# Patient Record
Sex: FEMALE | Race: ASIAN | NOT HISPANIC OR LATINO
[De-identification: names, ages, dates, MRNs, and addresses within clinical notes are randomized per-mention and may not be internally consistent; named-entity substitution may affect disease eponyms.]

---

## 2020-08-03 ENCOUNTER — APPOINTMENT (OUTPATIENT)
Dept: ANTEPARTUM | Facility: CLINIC | Age: 40
End: 2020-08-03

## 2020-08-03 ENCOUNTER — APPOINTMENT (OUTPATIENT)
Dept: ANTEPARTUM | Facility: CLINIC | Age: 40
End: 2020-08-03
Payer: COMMERCIAL

## 2020-08-03 ENCOUNTER — ASOB RESULT (OUTPATIENT)
Age: 40
End: 2020-08-03

## 2020-08-03 ENCOUNTER — APPOINTMENT (OUTPATIENT)
Dept: OBGYN | Facility: CLINIC | Age: 40
End: 2020-08-03
Payer: COMMERCIAL

## 2020-08-03 VITALS
HEIGHT: 63 IN | DIASTOLIC BLOOD PRESSURE: 70 MMHG | SYSTOLIC BLOOD PRESSURE: 120 MMHG | WEIGHT: 152 LBS | BODY MASS INDEX: 26.93 KG/M2

## 2020-08-03 PROBLEM — Z00.00 ENCOUNTER FOR PREVENTIVE HEALTH EXAMINATION: Status: ACTIVE | Noted: 2020-08-03

## 2020-08-03 LAB — HCG UR QL: POSITIVE

## 2020-08-03 PROCEDURE — 76801 OB US < 14 WKS SINGLE FETUS: CPT

## 2020-08-03 PROCEDURE — 81025 URINE PREGNANCY TEST: CPT

## 2020-08-03 PROCEDURE — 0501F PRENATAL FLOW SHEET: CPT

## 2020-08-05 ENCOUNTER — LABORATORY RESULT (OUTPATIENT)
Age: 40
End: 2020-08-05

## 2020-08-06 ENCOUNTER — TRANSCRIPTION ENCOUNTER (OUTPATIENT)
Age: 40
End: 2020-08-06

## 2020-08-14 ENCOUNTER — LABORATORY RESULT (OUTPATIENT)
Age: 40
End: 2020-08-14

## 2020-08-14 ENCOUNTER — APPOINTMENT (OUTPATIENT)
Dept: ANTEPARTUM | Facility: CLINIC | Age: 40
End: 2020-08-14

## 2020-08-14 ENCOUNTER — APPOINTMENT (OUTPATIENT)
Dept: MATERNAL FETAL MEDICINE | Facility: CLINIC | Age: 40
End: 2020-08-14
Payer: COMMERCIAL

## 2020-08-14 VITALS
DIASTOLIC BLOOD PRESSURE: 70 MMHG | WEIGHT: 154.38 LBS | BODY MASS INDEX: 29.15 KG/M2 | SYSTOLIC BLOOD PRESSURE: 148 MMHG | RESPIRATION RATE: 18 BRPM | OXYGEN SATURATION: 99 % | HEIGHT: 61 IN | HEART RATE: 87 BPM

## 2020-08-14 VITALS — DIASTOLIC BLOOD PRESSURE: 70 MMHG | SYSTOLIC BLOOD PRESSURE: 130 MMHG

## 2020-08-14 DIAGNOSIS — Z87.42 PERSONAL HISTORY OF OTHER DISEASES OF THE FEMALE GENITAL TRACT: ICD-10-CM

## 2020-08-14 DIAGNOSIS — Z83.3 FAMILY HISTORY OF DIABETES MELLITUS: ICD-10-CM

## 2020-08-14 PROCEDURE — 99204 OFFICE O/P NEW MOD 45 MIN: CPT

## 2020-08-14 NOTE — DISCUSSION/SUMMARY
[FreeTextEntry1] : She is 15 weeks and 1 day gestation by an early ultrasound dating.\par \par Regarding her advanced maternal age, I informed her of the association between advanced maternal age and fetal chromosomal disorders such as Down syndrome and structural birth defects. She was told that all pregnancies have a 2 to 3% risk of having a baby born with a birth defect, and a 1 to 2 % risk of having a baby born with developmental problems that cannot be diagnosed during pregnancy. I told her that there are two types of birth defects, structural and chromosomal. She was made aware that prenatal diagnosis is available to determine whether the fetus she is carrying has normal or abnormal chromosomes,and normal or abnormal anatomy. I discussed the various screening and diagnostic tests used for prenatal diagnosis.  I explained the difference between screening and diagnostic tests.  I discussed the results of her first trimester screening test which reported an increased risk for Down syndrome.  She was offered diagnostic testing with genetic amniocentesis.  She was made aware of the small risk of miscarriage associated with the diagnostic procedures, the limitations of the procedures, and the alternative of not having the procedures. All of her questions were answered. She decided not to have a diagnostic procedure at this time. She was offered genetic counseling. She told me that she has a genetic counseling appointment on 20.  She was scheduled  to have a detailed fetal anatomy ultrasound examination in 5 weeks. \par \par I told her that advanced maternal age has been associated with a higher incidence of gestational diabetes, and preeclampsia /eclampsia.  I also told her that advanced maternal age has been associated with an increased risk of stillbirth, and therefore, I recommend delivery during the 39 week of gestation in the event she has not given birth by 39 weeks of gestation. \par \par She was told that the prevalence of HBsAg carriers in the general population is 2 to 20%.  I told her that pregnant women who are HBsAg carriers can transmit the virus to the  infant during childbirth.  Pregnancy does not increase maternal morbidity or mortality from hepatitis B viral infection or increase the risk of pregnancy complications such as stillbirth or birth defects.  She denies having risk factors associated with hepatitis B viral infection such as intravenous drug use, receiving blood transfusions, and being a health care worker. She could have been infected from mother to infant transmission or sexual intercourse.  She does not know whether her mother or sexual partner have hepatitis B infection. She has tattoos and I told her that she could have been infected from the use of unsterile equipment.during tattooing. She was advised to have her household and sexual contacts undergo hepatitis B serology testing to determine their immune status. I told her that individuals who are zero negative should be given the hepatitis B immunoglobulin as well as the hepatitis B vaccination. She was advised to notify her pediatrician of her HBsAg carrier status so that the  infant can receive the hepatitis B immunoglobulin and the hepatitis B vaccination series. I ordered a hepatitis B serology panel of antigens and antibodies. I also ordered liver function tests. \par \par She was found to have 2 uterine fibroids during an ultrasound examination done on 8/3/20. I told her that growth of fibroids during pregnancy cannot be predicted. During the first trimester of pregnancy, fibroids can remain unchanged or increase in size.  During the second trimester, smaller myomas (less = 6 cm) usually remain unchanged or increase in size and larger fibroids become smaller.  During the third trimester of pregnancy, fibroids usually remain unchanged or decrease in size.  I suggest serial ultrasounds to evaluate the size of her fibroids during the course of her pregnancy.  She was told that the presence of multiple fibroids has been associated with an increased incidence of  labor and fetal malpresentations.  The incidence of  delivery is also increased in women with a fibroid uterus.  I also told her that uterine fibroids can degenerate during pregnancy and cause acute abdominal pain, low-grade fever, and leukocytosis.  Management of uterine fibroid degeneration involves the use of analgesics and observation for  labor.   \par \par I recommend a glucola challenge test to screen for gestational diabetes between 24 and 28 weeks gestation. I also recommend the Tdap vaccine between 27 and 36 weeks of gestation to prevent pertussis infection in the  infant. I recommend the flu vaccine during flu season (October through May). She should have serial ultrasounds to evaluate fetal growth and development.  I also suggest fetal surveillance during the third trimester of pregnancy with weekly NSTs or BPPs starting at 36 weeks gestation.  She can also perform daily fetal movement counts as an adjunct to the NSTs or BPPs.\par \par

## 2020-08-14 NOTE — SURGICAL HISTORY
[Last Pap: ___] : Last Pap: [unfilled] [Abn Paps] : no abnormal pap smears [Fibroids] : no fibroids [Breast Disease] : no breast disease [STI's] : no STI's [Infertility] : no infertility [Cysts] : no cysts [OC Use] : no OC use

## 2020-08-14 NOTE — FAMILY HISTORY
[Reported Family History Of Birth Defects] : no congenital heart defects [Nadeem-Sachs Carrier] : no Nadeem-Sachs [Family History] : no mental retardation/autism [Reported Family History Of Genetic Disease] : no history of child defect in child of baby father

## 2020-08-14 NOTE — OB HISTORY
[___] : at [unfilled] weeks GA [LMP: ___] : LMP: [unfilled] [RACHEL: ___] : RACHEL: [unfilled] [Spontaneous] : Spontaneous conception [at ___ wks] : at [unfilled] weeks [Sonogram] : sonogram [Normal Amount/Duration] : was of a normal amount and duration [Regular Cycle Intervals] : periods have been regular [FreeTextEntry1] : Pregnancy dated by a first trimester office sonogram done on June 16, 2020 by OB provider.  \par \par A first trimester screening test done on August 3, 2020 reported an increased risk for Down syndrome, and a low-risk for trisomy 18/13.\par \par Prenatal laboratory testing done August 5, 2020 revealed a positive hepatitis B surface antigen.\par  [Spotting Between  Menses] : no spotting between menses

## 2020-08-14 NOTE — PAST MEDICAL HISTORY
[HIV Infection] : no HIV [Exposure To Gonorrhea] : no gonorrhea [Syphilis] : no syphilis [Chlamydial Infections] : no chlamydia [Herpes Simplex] : no genital herpes [Hepatitis, C Virus] : no Hepatitis C [Human Papilloma Virus Infection] : no genital warts [Trichomoniasis] : no trichomoniasis

## 2020-08-14 NOTE — CHIEF COMPLAINT
[G ___] : G [unfilled] [P ___] : P [unfilled] [de-identified] : having a positive Hepatitis B surface antigen test during pregnancy

## 2020-08-14 NOTE — VITALS
[US Date: ___] : ultrasound performed on [unfilled]. [GA= ___ Weeks] : Results were GA of [unfilled] weeks [RACHEL by US (date): ___] : The calculated RACHEL by US is [unfilled] [GA= ___ Days] : and [unfilled] day(s) [By US] : this is the final RACHEL

## 2020-08-14 NOTE — ACTIVE PROBLEMS
[Diabetes Mellitus] : no diabetes mellitus [Heart Disease] : no heart disease [Hypertension] : no hypertension [Renal Disease] : no kidney disease, no UTI [Autoimmune Disease] : no autoimmune disease [Neurologic Disorder] : no neurologic disorder, no epilepsy [Psychiatric Disorders] : no psychiatric disorders [Hepatic Disorder] : no hepatitis, no liver disease [Depression] : no depression, no post partum depression [Thyroid Disorder] : no thyroid dysfunction [Thrombophlebitis] : no varicosities, no phlebitis [Blood Transfusion (___ Ml)] : no history of blood transfusion [Trauma] : no trauma/violence

## 2020-08-17 LAB
ALBUMIN SERPL ELPH-MCNC: 4.2 G/DL
ALP BLD-CCNC: 59 U/L
ALT SERPL-CCNC: 28 U/L
ANION GAP SERPL CALC-SCNC: 20 MMOL/L
AST SERPL-CCNC: 24 U/L
BILIRUB SERPL-MCNC: 0.2 MG/DL
BUN SERPL-MCNC: 6 MG/DL
CALCIUM SERPL-MCNC: 9.2 MG/DL
CHLORIDE SERPL-SCNC: 101 MMOL/L
CO2 SERPL-SCNC: 18 MMOL/L
CREAT SERPL-MCNC: 0.54 MG/DL
GLUCOSE SERPL-MCNC: 23 MG/DL
HBV CORE IGG+IGM SER QL: REACTIVE
HBV CORE IGM SER QL: NONREACTIVE
HBV SURFACE AB SER QL: NONREACTIVE
HBV SURFACE AB SER QL: NONREACTIVE
HBV SURFACE AG SER QL: REACTIVE
HBV SURFACE AG SERPL QL IA: REACTIVE
POTASSIUM SERPL-SCNC: 3.8 MMOL/L
PROT SERPL-MCNC: 6.8 G/DL
SODIUM SERPL-SCNC: 140 MMOL/L

## 2020-08-19 LAB
HBV DNA # SERPL NAA+PROBE: NOT DETECTED
HBV E AB SER QL: POSITIVE
HBV E AG SER QL: NEGATIVE
HBV E AG SER QL: NEGATIVE
HEPB DNA PCR LOG: NOT DETECTED LOG10IU/ML

## 2020-09-04 ENCOUNTER — APPOINTMENT (OUTPATIENT)
Dept: MATERNAL FETAL MEDICINE | Facility: CLINIC | Age: 40
End: 2020-09-04

## 2020-09-09 ENCOUNTER — APPOINTMENT (OUTPATIENT)
Dept: OBGYN | Facility: CLINIC | Age: 40
End: 2020-09-09
Payer: COMMERCIAL

## 2020-09-09 VITALS
HEIGHT: 61 IN | DIASTOLIC BLOOD PRESSURE: 75 MMHG | BODY MASS INDEX: 29.07 KG/M2 | SYSTOLIC BLOOD PRESSURE: 117 MMHG | WEIGHT: 154 LBS

## 2020-09-09 PROCEDURE — 0502F SUBSEQUENT PRENATAL CARE: CPT

## 2020-09-16 ENCOUNTER — APPOINTMENT (OUTPATIENT)
Dept: ANTEPARTUM | Facility: CLINIC | Age: 40
End: 2020-09-16
Payer: COMMERCIAL

## 2020-09-16 ENCOUNTER — ASOB RESULT (OUTPATIENT)
Age: 40
End: 2020-09-16

## 2020-09-16 PROCEDURE — 76811 OB US DETAILED SNGL FETUS: CPT

## 2020-09-16 PROCEDURE — 36415 COLL VENOUS BLD VENIPUNCTURE: CPT

## 2020-09-21 LAB
1ST TRIMESTER DATA: NORMAL
1ST TRIMESTER DATA: NORMAL
2ND TRIMESTER DATA: NORMAL
ADDENDUM DOC: NORMAL
ADDENDUM DOC: NORMAL
AFP PNL SERPL: NORMAL
AFP PNL SERPL: NORMAL
AFP SERPL-ACNC: NORMAL
B-HCG FREE SERPL-MCNC: NORMAL
CLINICAL BIOCHEMIST REVIEW: ABNORMAL
CLINICAL BIOCHEMIST REVIEW: NORMAL
FREE BETA HCG 1ST TRIMESTER: NORMAL
FREE BETA HCG 1ST TRIMESTER: NORMAL
INHIBIN A SERPL-MCNC: NORMAL
Lab: NORMAL
NOTES NTD: NORMAL
NOTES NTD: NORMAL
NT: NORMAL
NT: NORMAL
PAPP-A SERPL-ACNC: NORMAL
PAPP-A SERPL-ACNC: NORMAL
TRISOMY 18/3: NORMAL
U ESTRIOL SERPL-SCNC: NORMAL

## 2020-09-23 LAB
CLARIM 15Q11.2: NORMAL
CLARIM 1P36: NORMAL
CLARIM 22Q11.2: NORMAL
CLARIM 4P-/WOLF-HIRSCHHORN: NORMAL
CLARIM 5P-/CRI DU CHAT: NORMAL
CLARIM ADDITIONAL INFO: NORMAL
CLARIM CHROMOSOME 13: NORMAL
CLARIM CHROMOSOME 18: NORMAL
CLARIM CHROMOSOME 21: NORMAL
CLARIM SEX CHROMOSOMES: NORMAL
CLARITEST NIPT W/MICRO: NORMAL

## 2020-10-09 ENCOUNTER — APPOINTMENT (OUTPATIENT)
Dept: OBGYN | Facility: CLINIC | Age: 40
End: 2020-10-09
Payer: COMMERCIAL

## 2020-10-09 ENCOUNTER — NON-APPOINTMENT (OUTPATIENT)
Age: 40
End: 2020-10-09

## 2020-10-09 VITALS
SYSTOLIC BLOOD PRESSURE: 110 MMHG | BODY MASS INDEX: 30.02 KG/M2 | WEIGHT: 159 LBS | HEIGHT: 61 IN | DIASTOLIC BLOOD PRESSURE: 70 MMHG

## 2020-10-09 PROCEDURE — 90686 IIV4 VACC NO PRSV 0.5 ML IM: CPT

## 2020-10-09 PROCEDURE — 90471 IMMUNIZATION ADMIN: CPT

## 2020-10-09 PROCEDURE — 0502F SUBSEQUENT PRENATAL CARE: CPT

## 2020-10-30 ENCOUNTER — LABORATORY RESULT (OUTPATIENT)
Age: 40
End: 2020-10-30

## 2020-11-06 ENCOUNTER — NON-APPOINTMENT (OUTPATIENT)
Age: 40
End: 2020-11-06

## 2020-11-06 ENCOUNTER — APPOINTMENT (OUTPATIENT)
Dept: OBGYN | Facility: CLINIC | Age: 40
End: 2020-11-06
Payer: COMMERCIAL

## 2020-11-06 VITALS
BODY MASS INDEX: 30.4 KG/M2 | HEIGHT: 61 IN | DIASTOLIC BLOOD PRESSURE: 70 MMHG | SYSTOLIC BLOOD PRESSURE: 106 MMHG | WEIGHT: 161 LBS

## 2020-11-06 PROCEDURE — 0502F SUBSEQUENT PRENATAL CARE: CPT

## 2020-11-09 ENCOUNTER — LABORATORY RESULT (OUTPATIENT)
Age: 40
End: 2020-11-09

## 2020-11-13 ENCOUNTER — APPOINTMENT (OUTPATIENT)
Dept: ANTEPARTUM | Facility: CLINIC | Age: 40
End: 2020-11-13
Payer: COMMERCIAL

## 2020-11-13 ENCOUNTER — ASOB RESULT (OUTPATIENT)
Age: 40
End: 2020-11-13

## 2020-11-13 PROCEDURE — 76816 OB US FOLLOW-UP PER FETUS: CPT

## 2020-11-13 PROCEDURE — 99072 ADDL SUPL MATRL&STAF TM PHE: CPT

## 2020-12-01 ENCOUNTER — APPOINTMENT (OUTPATIENT)
Dept: GASTROENTEROLOGY | Facility: CLINIC | Age: 40
End: 2020-12-01
Payer: COMMERCIAL

## 2020-12-01 VITALS
HEIGHT: 61 IN | SYSTOLIC BLOOD PRESSURE: 115 MMHG | HEART RATE: 79 BPM | TEMPERATURE: 98 F | BODY MASS INDEX: 31.15 KG/M2 | DIASTOLIC BLOOD PRESSURE: 61 MMHG | WEIGHT: 165 LBS

## 2020-12-01 DIAGNOSIS — Z78.9 OTHER SPECIFIED HEALTH STATUS: ICD-10-CM

## 2020-12-01 PROCEDURE — 99202 OFFICE O/P NEW SF 15 MIN: CPT

## 2020-12-01 PROCEDURE — 99072 ADDL SUPL MATRL&STAF TM PHE: CPT

## 2020-12-01 NOTE — ASSESSMENT
[FreeTextEntry1] : Pt with recently discovered positive Hep B antigen and antibody. LFTs are normal and Hep B is undetectable by PCR. No current treatment necessary. Patient was educated on importance of vaccinating her child after birth and to further discuss it with her OBGYN and future pediatrician. All questions and concerns addressed.\par F/U in 6 months

## 2020-12-01 NOTE — ADDENDUM
[FreeTextEntry1] : i, Lu Bass NP, acted as scribe for Dr. Avery Sewell for this patient encounter\par \par I have personally seen and examined the patient. I agree with the history, physical examination, assessment and recommendations as noted above.\par \par Avery Sewell MD\par Gastroenterology\par

## 2020-12-01 NOTE — PHYSICAL EXAM
Results given   [General Appearance - Alert] : alert [General Appearance - In No Acute Distress] : in no acute distress [Sclera] : the sclera and conjunctiva were normal [PERRL With Normal Accommodation] : pupils were equal in size, round, and reactive to light [Extraocular Movements] : extraocular movements were intact [Outer Ear] : the ears and nose were normal in appearance [Oropharynx] : the oropharynx was normal [Neck Appearance] : the appearance of the neck was normal [Neck Cervical Mass (___cm)] : no neck mass was observed [Jugular Venous Distention Increased] : there was no jugular-venous distention [Thyroid Diffuse Enlargement] : the thyroid was not enlarged [Thyroid Nodule] : there were no palpable thyroid nodules [Auscultation Breath Sounds / Voice Sounds] : lungs were clear to auscultation bilaterally [Heart Rate And Rhythm] : heart rate was normal and rhythm regular [Heart Sounds] : normal S1 and S2 [Heart Sounds Gallop] : no gallops [Murmurs] : no murmurs [Heart Sounds Pericardial Friction Rub] : no pericardial rub [Bowel Sounds] : normal bowel sounds [Abdomen Soft] : soft [Abdomen Tenderness] : non-tender [Abdomen Mass (___ Cm)] : no abdominal mass palpated [Abnormal Walk] : normal gait [Nail Clubbing] : no clubbing  or cyanosis of the fingernails [Musculoskeletal - Swelling] : no joint swelling seen [Motor Tone] : muscle strength and tone were normal [Skin Color & Pigmentation] : normal skin color and pigmentation [Skin Turgor] : normal skin turgor [] : no rash [Deep Tendon Reflexes (DTR)] : deep tendon reflexes were 2+ and symmetric [Sensation] : the sensory exam was normal to light touch and pinprick [No Focal Deficits] : no focal deficits [Oriented To Time, Place, And Person] : oriented to person, place, and time [Impaired Insight] : insight and judgment were intact [Affect] : the affect was normal [FreeTextEntry1] : gravid

## 2020-12-01 NOTE — HISTORY OF PRESENT ILLNESS
[Heartburn] : denies heartburn [Nausea] : denies nausea [Vomiting] : denies vomiting [Diarrhea] : denies diarrhea [Constipation] : denies constipation [Yellow Skin Or Eyes (Jaundice)] : denies jaundice [Abdominal Pain] : denies abdominal pain [Abdominal Swelling] : denies abdominal swelling [Rectal Pain] : denies rectal pain [de-identified] : FLO KINNEY is a 40 year old female presenting today for evaluation of a recent Hep B diagnosis. She is currently 31 weeks pregnant with her first child. In August she underwent basic screenings with her OBGYN and was found to have a positive Hep B antigen. Her Hep B PCR in undetectable and her LFTs are normal. She denies any known liver disease. She was born in the [facility] and moved to the  in 1995 and did not know that she was positive until this recent test. She is unsure if she was ever vaccinated for Hep B as a child.

## 2020-12-04 ENCOUNTER — NON-APPOINTMENT (OUTPATIENT)
Age: 40
End: 2020-12-04

## 2020-12-04 ENCOUNTER — APPOINTMENT (OUTPATIENT)
Dept: OBGYN | Facility: CLINIC | Age: 40
End: 2020-12-04
Payer: COMMERCIAL

## 2020-12-04 VITALS
DIASTOLIC BLOOD PRESSURE: 74 MMHG | SYSTOLIC BLOOD PRESSURE: 120 MMHG | HEIGHT: 61 IN | BODY MASS INDEX: 30.78 KG/M2 | WEIGHT: 163 LBS

## 2020-12-04 PROCEDURE — 0502F SUBSEQUENT PRENATAL CARE: CPT

## 2020-12-04 PROCEDURE — 90471 IMMUNIZATION ADMIN: CPT

## 2020-12-04 PROCEDURE — 90715 TDAP VACCINE 7 YRS/> IM: CPT

## 2020-12-21 ENCOUNTER — APPOINTMENT (OUTPATIENT)
Dept: OBGYN | Facility: CLINIC | Age: 40
End: 2020-12-21
Payer: COMMERCIAL

## 2020-12-21 ENCOUNTER — NON-APPOINTMENT (OUTPATIENT)
Age: 40
End: 2020-12-21

## 2020-12-21 VITALS
DIASTOLIC BLOOD PRESSURE: 76 MMHG | HEIGHT: 61 IN | WEIGHT: 165 LBS | SYSTOLIC BLOOD PRESSURE: 117 MMHG | BODY MASS INDEX: 31.15 KG/M2

## 2020-12-21 PROCEDURE — 0502F SUBSEQUENT PRENATAL CARE: CPT

## 2020-12-28 ENCOUNTER — ASOB RESULT (OUTPATIENT)
Age: 40
End: 2020-12-28

## 2020-12-28 ENCOUNTER — APPOINTMENT (OUTPATIENT)
Dept: ANTEPARTUM | Facility: CLINIC | Age: 40
End: 2020-12-28
Payer: COMMERCIAL

## 2020-12-28 PROCEDURE — 76819 FETAL BIOPHYS PROFIL W/O NST: CPT

## 2020-12-28 PROCEDURE — 76816 OB US FOLLOW-UP PER FETUS: CPT

## 2020-12-28 PROCEDURE — 99072 ADDL SUPL MATRL&STAF TM PHE: CPT

## 2021-01-05 ENCOUNTER — APPOINTMENT (OUTPATIENT)
Dept: OBGYN | Facility: CLINIC | Age: 41
End: 2021-01-05
Payer: COMMERCIAL

## 2021-01-05 VITALS
HEIGHT: 61 IN | SYSTOLIC BLOOD PRESSURE: 115 MMHG | WEIGHT: 168 LBS | DIASTOLIC BLOOD PRESSURE: 78 MMHG | BODY MASS INDEX: 31.72 KG/M2

## 2021-01-05 PROCEDURE — 0502F SUBSEQUENT PRENATAL CARE: CPT

## 2021-01-07 LAB
GP B STREP DNA SPEC QL NAA+PROBE: NORMAL
GP B STREP DNA SPEC QL NAA+PROBE: NOT DETECTED
SOURCE GBS: NORMAL

## 2021-01-10 LAB
BASOPHILS # BLD AUTO: 0.04 K/UL
BASOPHILS NFR BLD AUTO: 0.4 %
EOSINOPHIL # BLD AUTO: 0.13 K/UL
EOSINOPHIL NFR BLD AUTO: 1.2 %
HCT VFR BLD CALC: 37.2 %
HGB BLD-MCNC: 11.8 G/DL
HIV1+2 AB SPEC QL IA.RAPID: NONREACTIVE
IMM GRANULOCYTES NFR BLD AUTO: 1.2 %
LYMPHOCYTES # BLD AUTO: 2.04 K/UL
LYMPHOCYTES NFR BLD AUTO: 19.5 %
MAN DIFF?: NORMAL
MCHC RBC-ENTMCNC: 28.4 PG
MCHC RBC-ENTMCNC: 31.7 GM/DL
MCV RBC AUTO: 89.4 FL
MONOCYTES # BLD AUTO: 0.85 K/UL
MONOCYTES NFR BLD AUTO: 8.1 %
NEUTROPHILS # BLD AUTO: 7.26 K/UL
NEUTROPHILS NFR BLD AUTO: 69.6 %
PLATELET # BLD AUTO: 204 K/UL
RBC # BLD: 4.16 M/UL
RBC # FLD: 13.8 %
WBC # FLD AUTO: 10.45 K/UL

## 2021-01-15 ENCOUNTER — APPOINTMENT (OUTPATIENT)
Dept: OBGYN | Facility: CLINIC | Age: 41
End: 2021-01-15
Payer: COMMERCIAL

## 2021-01-15 VITALS
WEIGHT: 174 LBS | HEIGHT: 61 IN | BODY MASS INDEX: 32.85 KG/M2 | SYSTOLIC BLOOD PRESSURE: 110 MMHG | DIASTOLIC BLOOD PRESSURE: 70 MMHG

## 2021-01-15 PROCEDURE — 0502F SUBSEQUENT PRENATAL CARE: CPT

## 2021-01-18 ENCOUNTER — APPOINTMENT (OUTPATIENT)
Dept: ANTEPARTUM | Facility: CLINIC | Age: 41
End: 2021-01-18

## 2021-01-22 ENCOUNTER — APPOINTMENT (OUTPATIENT)
Dept: OBGYN | Facility: CLINIC | Age: 41
End: 2021-01-22
Payer: COMMERCIAL

## 2021-01-22 VITALS
BODY MASS INDEX: 30.05 KG/M2 | DIASTOLIC BLOOD PRESSURE: 70 MMHG | HEIGHT: 64 IN | SYSTOLIC BLOOD PRESSURE: 110 MMHG | WEIGHT: 176 LBS

## 2021-01-22 PROCEDURE — 99072 ADDL SUPL MATRL&STAF TM PHE: CPT

## 2021-01-22 PROCEDURE — 0502F SUBSEQUENT PRENATAL CARE: CPT

## 2021-01-22 PROCEDURE — 59426 ANTEPARTUM CARE ONLY: CPT

## 2021-01-27 ENCOUNTER — TRANSCRIPTION ENCOUNTER (OUTPATIENT)
Age: 41
End: 2021-01-27

## 2021-01-28 ENCOUNTER — OUTPATIENT (OUTPATIENT)
Dept: OUTPATIENT SERVICES | Facility: HOSPITAL | Age: 41
LOS: 1 days | End: 2021-01-28

## 2021-01-28 ENCOUNTER — INPATIENT (INPATIENT)
Facility: HOSPITAL | Age: 41
LOS: 3 days | Discharge: HOME | End: 2021-02-01
Payer: COMMERCIAL

## 2021-01-28 VITALS
RESPIRATION RATE: 20 BRPM | HEART RATE: 75 BPM | SYSTOLIC BLOOD PRESSURE: 161 MMHG | DIASTOLIC BLOOD PRESSURE: 95 MMHG | TEMPERATURE: 98 F

## 2021-01-28 DIAGNOSIS — Z98.890 OTHER SPECIFIED POSTPROCEDURAL STATES: Chronic | ICD-10-CM

## 2021-01-28 DIAGNOSIS — O34.219 MATERNAL CARE FOR UNSPECIFIED TYPE SCAR FROM PREVIOUS CESAREAN DELIVERY: ICD-10-CM

## 2021-01-28 DIAGNOSIS — Z01.818 ENCOUNTER FOR OTHER PREPROCEDURAL EXAMINATION: ICD-10-CM

## 2021-01-28 DIAGNOSIS — O47.1 FALSE LABOR AT OR AFTER 37 COMPLETED WEEKS OF GESTATION: ICD-10-CM

## 2021-01-28 LAB
ABO RH CONFIRMATION: SIGNIFICANT CHANGE UP
ALBUMIN SERPL ELPH-MCNC: 4 G/DL — SIGNIFICANT CHANGE UP (ref 3.3–5.2)
ALP SERPL-CCNC: 166 U/L — HIGH (ref 40–120)
ALT FLD-CCNC: 8 U/L — SIGNIFICANT CHANGE UP
ANION GAP SERPL CALC-SCNC: 14 MMOL/L — SIGNIFICANT CHANGE UP (ref 5–17)
APTT BLD: 28.4 SEC — SIGNIFICANT CHANGE UP (ref 27.5–35.5)
AST SERPL-CCNC: 15 U/L — SIGNIFICANT CHANGE UP
BASOPHILS # BLD AUTO: 0.03 K/UL — SIGNIFICANT CHANGE UP (ref 0–0.2)
BASOPHILS NFR BLD AUTO: 0.3 % — SIGNIFICANT CHANGE UP (ref 0–2)
BILIRUB SERPL-MCNC: 0.3 MG/DL — LOW (ref 0.4–2)
BLD GP AB SCN SERPL QL: SIGNIFICANT CHANGE UP
BUN SERPL-MCNC: 10 MG/DL — SIGNIFICANT CHANGE UP (ref 8–20)
CALCIUM SERPL-MCNC: 9 MG/DL — SIGNIFICANT CHANGE UP (ref 8.6–10.2)
CHLORIDE SERPL-SCNC: 102 MMOL/L — SIGNIFICANT CHANGE UP (ref 98–107)
CO2 SERPL-SCNC: 20 MMOL/L — LOW (ref 22–29)
CREAT SERPL-MCNC: 0.51 MG/DL — SIGNIFICANT CHANGE UP (ref 0.5–1.3)
EOSINOPHIL # BLD AUTO: 0.08 K/UL — SIGNIFICANT CHANGE UP (ref 0–0.5)
EOSINOPHIL NFR BLD AUTO: 0.9 % — SIGNIFICANT CHANGE UP (ref 0–6)
FIBRINOGEN PPP-MCNC: 858 MG/DL — CRITICAL HIGH (ref 290–520)
GLUCOSE SERPL-MCNC: 81 MG/DL — SIGNIFICANT CHANGE UP (ref 70–99)
HCT VFR BLD CALC: 39.2 % — SIGNIFICANT CHANGE UP (ref 34.5–45)
HGB BLD-MCNC: 13.6 G/DL — SIGNIFICANT CHANGE UP (ref 11.5–15.5)
IMM GRANULOCYTES NFR BLD AUTO: 0.8 % — SIGNIFICANT CHANGE UP (ref 0–1.5)
INR BLD: 0.9 RATIO — SIGNIFICANT CHANGE UP (ref 0.88–1.16)
LDH SERPL L TO P-CCNC: 188 U/L — SIGNIFICANT CHANGE UP (ref 98–192)
LYMPHOCYTES # BLD AUTO: 1.53 K/UL — SIGNIFICANT CHANGE UP (ref 1–3.3)
LYMPHOCYTES # BLD AUTO: 17.4 % — SIGNIFICANT CHANGE UP (ref 13–44)
MAGNESIUM SERPL-MCNC: 3.9 MG/DL — HIGH (ref 1.6–2.6)
MCHC RBC-ENTMCNC: 29.8 PG — SIGNIFICANT CHANGE UP (ref 27–34)
MCHC RBC-ENTMCNC: 34.7 GM/DL — SIGNIFICANT CHANGE UP (ref 32–36)
MCV RBC AUTO: 85.8 FL — SIGNIFICANT CHANGE UP (ref 80–100)
MONOCYTES # BLD AUTO: 0.71 K/UL — SIGNIFICANT CHANGE UP (ref 0–0.9)
MONOCYTES NFR BLD AUTO: 8.1 % — SIGNIFICANT CHANGE UP (ref 2–14)
NEUTROPHILS # BLD AUTO: 6.35 K/UL — SIGNIFICANT CHANGE UP (ref 1.8–7.4)
NEUTROPHILS NFR BLD AUTO: 72.5 % — SIGNIFICANT CHANGE UP (ref 43–77)
PLATELET # BLD AUTO: 200 K/UL — SIGNIFICANT CHANGE UP (ref 150–400)
POTASSIUM SERPL-MCNC: 3.8 MMOL/L — SIGNIFICANT CHANGE UP (ref 3.5–5.3)
POTASSIUM SERPL-SCNC: 3.8 MMOL/L — SIGNIFICANT CHANGE UP (ref 3.5–5.3)
PROT SERPL-MCNC: 7 G/DL — SIGNIFICANT CHANGE UP (ref 6.6–8.7)
PROTHROM AB SERPL-ACNC: 10.5 SEC — LOW (ref 10.6–13.6)
RBC # BLD: 4.57 M/UL — SIGNIFICANT CHANGE UP (ref 3.8–5.2)
RBC # FLD: 13.7 % — SIGNIFICANT CHANGE UP (ref 10.3–14.5)
SARS-COV-2 RNA SPEC QL NAA+PROBE: SIGNIFICANT CHANGE UP
SODIUM SERPL-SCNC: 136 MMOL/L — SIGNIFICANT CHANGE UP (ref 135–145)
URATE SERPL-MCNC: 3.1 MG/DL — SIGNIFICANT CHANGE UP (ref 2.4–5.7)
WBC # BLD: 8.77 K/UL — SIGNIFICANT CHANGE UP (ref 3.8–10.5)
WBC # FLD AUTO: 8.77 K/UL — SIGNIFICANT CHANGE UP (ref 3.8–10.5)

## 2021-01-28 PROCEDURE — 59515 CESAREAN DELIVERY: CPT

## 2021-01-28 RX ORDER — NALOXONE HYDROCHLORIDE 4 MG/.1ML
0.1 SPRAY NASAL
Refills: 0 | Status: DISCONTINUED | OUTPATIENT
Start: 2021-01-28 | End: 2021-02-01

## 2021-01-28 RX ORDER — LANOLIN
1 OINTMENT (GRAM) TOPICAL EVERY 6 HOURS
Refills: 0 | Status: DISCONTINUED | OUTPATIENT
Start: 2021-01-28 | End: 2021-02-01

## 2021-01-28 RX ORDER — TETANUS TOXOID, REDUCED DIPHTHERIA TOXOID AND ACELLULAR PERTUSSIS VACCINE, ADSORBED 5; 2.5; 8; 8; 2.5 [IU]/.5ML; [IU]/.5ML; UG/.5ML; UG/.5ML; UG/.5ML
0.5 SUSPENSION INTRAMUSCULAR ONCE
Refills: 0 | Status: DISCONTINUED | OUTPATIENT
Start: 2021-01-28 | End: 2021-02-01

## 2021-01-28 RX ORDER — CITRIC ACID/SODIUM CITRATE 300-500 MG
30 SOLUTION, ORAL ORAL ONCE
Refills: 0 | Status: DISCONTINUED | OUTPATIENT
Start: 2021-01-28 | End: 2021-01-28

## 2021-01-28 RX ORDER — OXYCODONE HYDROCHLORIDE 5 MG/1
5 TABLET ORAL ONCE
Refills: 0 | Status: DISCONTINUED | OUTPATIENT
Start: 2021-01-28 | End: 2021-02-01

## 2021-01-28 RX ORDER — MAGNESIUM SULFATE 500 MG/ML
2 VIAL (ML) INJECTION
Qty: 40 | Refills: 0 | Status: DISCONTINUED | OUTPATIENT
Start: 2021-01-28 | End: 2021-01-28

## 2021-01-28 RX ORDER — BUTORPHANOL TARTRATE 2 MG/ML
0.25 INJECTION, SOLUTION INTRAMUSCULAR; INTRAVENOUS EVERY 6 HOURS
Refills: 0 | Status: DISCONTINUED | OUTPATIENT
Start: 2021-01-28 | End: 2021-01-28

## 2021-01-28 RX ORDER — DIPHENHYDRAMINE HCL 50 MG
25 CAPSULE ORAL EVERY 6 HOURS
Refills: 0 | Status: DISCONTINUED | OUTPATIENT
Start: 2021-01-28 | End: 2021-02-01

## 2021-01-28 RX ORDER — OXYTOCIN 10 UNIT/ML
333.33 VIAL (ML) INJECTION
Qty: 20 | Refills: 0 | Status: DISCONTINUED | OUTPATIENT
Start: 2021-01-28 | End: 2021-02-01

## 2021-01-28 RX ORDER — ENOXAPARIN SODIUM 100 MG/ML
40 INJECTION SUBCUTANEOUS EVERY 24 HOURS
Refills: 0 | Status: DISCONTINUED | OUTPATIENT
Start: 2021-01-28 | End: 2021-02-01

## 2021-01-28 RX ORDER — FAMOTIDINE 10 MG/ML
20 INJECTION INTRAVENOUS ONCE
Refills: 0 | Status: COMPLETED | OUTPATIENT
Start: 2021-01-28 | End: 2021-01-28

## 2021-01-28 RX ORDER — SIMETHICONE 80 MG/1
80 TABLET, CHEWABLE ORAL EVERY 4 HOURS
Refills: 0 | Status: DISCONTINUED | OUTPATIENT
Start: 2021-01-28 | End: 2021-02-01

## 2021-01-28 RX ORDER — DEXAMETHASONE 0.5 MG/5ML
4 ELIXIR ORAL EVERY 6 HOURS
Refills: 0 | Status: DISCONTINUED | OUTPATIENT
Start: 2021-01-28 | End: 2021-02-01

## 2021-01-28 RX ORDER — MAGNESIUM HYDROXIDE 400 MG/1
30 TABLET, CHEWABLE ORAL
Refills: 0 | Status: DISCONTINUED | OUTPATIENT
Start: 2021-01-28 | End: 2021-02-01

## 2021-01-28 RX ORDER — SODIUM CHLORIDE 9 MG/ML
1000 INJECTION, SOLUTION INTRAVENOUS
Refills: 0 | Status: DISCONTINUED | OUTPATIENT
Start: 2021-01-28 | End: 2021-01-28

## 2021-01-28 RX ORDER — MAGNESIUM SULFATE 500 MG/ML
4 VIAL (ML) INJECTION ONCE
Refills: 0 | Status: COMPLETED | OUTPATIENT
Start: 2021-01-28 | End: 2021-01-28

## 2021-01-28 RX ORDER — IBUPROFEN 200 MG
600 TABLET ORAL EVERY 6 HOURS
Refills: 0 | Status: COMPLETED | OUTPATIENT
Start: 2021-01-28 | End: 2021-12-27

## 2021-01-28 RX ORDER — CEFAZOLIN SODIUM 1 G
2000 VIAL (EA) INJECTION ONCE
Refills: 0 | Status: COMPLETED | OUTPATIENT
Start: 2021-01-28 | End: 2021-01-28

## 2021-01-28 RX ORDER — ONDANSETRON 8 MG/1
4 TABLET, FILM COATED ORAL EVERY 6 HOURS
Refills: 0 | Status: DISCONTINUED | OUTPATIENT
Start: 2021-01-28 | End: 2021-02-01

## 2021-01-28 RX ORDER — METOCLOPRAMIDE HCL 10 MG
10 TABLET ORAL ONCE
Refills: 0 | Status: COMPLETED | OUTPATIENT
Start: 2021-01-28 | End: 2021-01-28

## 2021-01-28 RX ORDER — KETOROLAC TROMETHAMINE 30 MG/ML
30 SYRINGE (ML) INJECTION EVERY 6 HOURS
Refills: 0 | Status: DISCONTINUED | OUTPATIENT
Start: 2021-01-28 | End: 2021-01-29

## 2021-01-28 RX ORDER — LABETALOL HCL 100 MG
20 TABLET ORAL ONCE
Refills: 0 | Status: COMPLETED | OUTPATIENT
Start: 2021-01-28 | End: 2021-01-28

## 2021-01-28 RX ORDER — SODIUM CHLORIDE 9 MG/ML
1000 INJECTION, SOLUTION INTRAVENOUS
Refills: 0 | Status: DISCONTINUED | OUTPATIENT
Start: 2021-01-28 | End: 2021-02-01

## 2021-01-28 RX ORDER — DIPHENHYDRAMINE HCL 50 MG
25 CAPSULE ORAL EVERY 4 HOURS
Refills: 0 | Status: DISCONTINUED | OUTPATIENT
Start: 2021-01-28 | End: 2021-02-01

## 2021-01-28 RX ORDER — MAGNESIUM SULFATE 500 MG/ML
2.5 VIAL (ML) INJECTION
Qty: 40 | Refills: 0 | Status: DISCONTINUED | OUTPATIENT
Start: 2021-01-28 | End: 2021-02-01

## 2021-01-28 RX ORDER — ACETAMINOPHEN 500 MG
975 TABLET ORAL
Refills: 0 | Status: DISCONTINUED | OUTPATIENT
Start: 2021-01-28 | End: 2021-02-01

## 2021-01-28 RX ORDER — OXYCODONE HYDROCHLORIDE 5 MG/1
5 TABLET ORAL
Refills: 0 | Status: COMPLETED | OUTPATIENT
Start: 2021-01-28 | End: 2021-02-04

## 2021-01-28 RX ORDER — OXYTOCIN 10 UNIT/ML
333.33 VIAL (ML) INJECTION
Qty: 20 | Refills: 0 | Status: COMPLETED | OUTPATIENT
Start: 2021-01-28 | End: 2021-01-28

## 2021-01-28 RX ORDER — SODIUM CHLORIDE 9 MG/ML
1000 INJECTION, SOLUTION INTRAVENOUS ONCE
Refills: 0 | Status: DISCONTINUED | OUTPATIENT
Start: 2021-01-28 | End: 2021-01-28

## 2021-01-28 RX ADMIN — Medication 100 MILLIGRAM(S): at 15:15

## 2021-01-28 RX ADMIN — Medication 10 MILLIGRAM(S): at 19:22

## 2021-01-28 RX ADMIN — Medication 62.5 GM/HR: at 20:32

## 2021-01-28 RX ADMIN — Medication 200 GRAM(S): at 10:10

## 2021-01-28 RX ADMIN — Medication 30 MILLIGRAM(S): at 17:32

## 2021-01-28 RX ADMIN — Medication 20 MILLIGRAM(S): at 10:10

## 2021-01-28 RX ADMIN — Medication 50 GM/HR: at 10:35

## 2021-01-28 RX ADMIN — Medication 1000 MILLIUNIT(S)/MIN: at 15:33

## 2021-01-28 RX ADMIN — FAMOTIDINE 20 MILLIGRAM(S): 10 INJECTION INTRAVENOUS at 12:19

## 2021-01-28 RX ADMIN — Medication 30 MILLIGRAM(S): at 23:35

## 2021-01-28 NOTE — OB PROVIDER H&P - NSHPPHYSICALEXAM_GEN_ALL_CORE
Vital Signs Last 24 Hrs  T(C): 36.7 (28 Jan 2021 09:57), Max: 36.7 (28 Jan 2021 09:57)  T(F): 98 (28 Jan 2021 09:57), Max: 98 (28 Jan 2021 09:57)  HR: 82 (28 Jan 2021 10:15) (75 - 88)  BP: 153/84 (28 Jan 2021 10:15) (153/84 - 166/104)  RR: 20 (28 Jan 2021 09:57) (20 - 20)    General: NAD, well-appearing  Heart: RRR  Lungs: CTAB  Abdominal: soft, gravid  Ext: non-tender, non-edematous, 2+ DTRs in UE bilaterally  Bedside sono: breech, anterior placenta  FHT: baseline 125, moderate variability, +accels, -decels   [place]: chuck irregularly

## 2021-01-28 NOTE — OB PROVIDER DELIVERY SUMMARY - NSPROVIDERDELIVERYNOTE_OBGYN_ALL_OB_FT
Brief  Delivery Summary    Procedure: pLTCS for maureen breech presentation  Findings: Viable female infant, apgars 9/9, weight 3450g,  breech presentation. Grossly normal uterus, fallopian tubes and ovaries.   Single layer uterine closure  SubQ skin closure  EBL: 573cc  UOP: 50cc  Fluids in OR: 1500cc  Complications: None

## 2021-01-28 NOTE — OB RN INTRAOPERATIVE NOTE - NS_CULTURES_OBGYN_ALL_OB
Patient:   APRIL CARABALLO JR            MRN: [id number]            FIN: [id number]               Age:   62 years     Sex:  MALE     :  59   Associated Diagnoses:   None   Author:   ROBERT HIDALGO      Chief Complaint            History of Present Illness   62yoM h/o htn, hl, esoph rupture p/w L temp-occ ICH. Per wife and daughter, pt was in usual state of health until this morning when at 11am they noted pt c/o dizziness. About an hour later pt started having n/v, which continued into the evening. Pt also became confused and lethargic during this time, prompting family to take him to OSH where HCT revealed L temp-occ ICH with edema and midline shift, for which pt was transferred to [facility] for further eval.        Complaint:.  Notes (Baseline Modified [place] Score (mRS)).        Review of Systems   Neurologic:  Negative except as documented in history of present illness.       Histories   Past Med History:    No problem items selected or recorded.   Family History:    No family history items have been selected or recorded.   Procedure History:    No active procedure history items have been selected or recorded.   Social History        No qualifying data available.  .     Family history of cerebral aneurysm:        Health Status   Current medications:    Medications (7) Active  Scheduled: (4)  *Do NOT give Anticoagulant/Antiplatelet Meds  1 each, N/A, Daily  *Do NOT give Heparin or LMWH  1 each, N/A, Daily  *Do NOT give NSAIDs  1 each, N/A, Daily  levETIRAcetam  500 mg 5 mL, IVPB, Q12H  Continuous: (2)  niCARdipine 40 mg [5 mg/hr] + premixed Solution 200 mL  200 mL, IV, 25 mL/hr  Sodium Chloride 0.9% 1,000 mL  1,000 mL, IV, 80 mL/hr  PRN: (1)  Labetalol 100 mg/20 mL inj MDV  10 mg 2 mL, Slow IV Push, Q10 Minutes        Physical Examination   VS/Measurements        Vitals between:   2018 02:16:10   TO   2018 02:16:10                   LAST  RESULT MINIMUM MAXIMUM  Temperature 36.8 36.8 36.8  Heart Rate 97 65 98  Respiratory Rate 15 15 28  NISBP           153 148 193  NIDBP           98 62 113  NIMBP           114 88 139  SpO2                    98 95 99     Neurologic:  awake and alert, oriented to self only, moderate expressive aphasia (naming 1/3), mild receptive aphasia, R homonymous hemianopsia, perrl, machuca x4.      ICH Score: Components for ICH score as follows:  GCS score:   [_]  (3-4): 2points  [_] (5-12): 1 point  [x_] (13-15): 0 points    ICH volume:  [_x] (>30cm) 1 point  [_] (<30cm) 0 point    IVH:  [_]  yes: 1point  [_x]  no: 0 points    Infratentorial origin of ICH:  [_]  Yes: 1 point  [_x]  No: 0 points    Age:  [_]  Age 80 years or older: 1 point  [_x]  younger than 80 years: 0 points    TOTAL SCORE: 1         Review / Management   Personally reviewed the follwowing imaging:             Impression and Plan     Neuro  Intracranial Hemorrhage with cerebral edema and midline shift  Admit to NCCU, close neurochecks. Diff dx includes htn vs mass vs vasc malformation vs hemorrhagic conversion of ischemic infarct vs amyloid. Given location and appearance, will check MRI w/wo and MRA brain for further eval. If edema or exam worsens may consider hyperosomolar tx.  -ICH score: 1  -Based on the ICH score of *, is associated with * mortality rate   0 points: 0%   1 point: 13%  -Neurosurgical consult  -Assess for rehab order  -Stroke Education order  -Smoking Cessation education order    CV   -Blood Pressure Goals: SBP<160, cardene gtt    Pulm  stable on RA    GI   -Dysphagia screening order  -h/o esoph rupture    ID  afebrile, slightly inc wbcs- trend    Proph  -SCDs, heparin subcutaneous held due to ICH    Dispo:  -Admission to Neuro ICU      Code Status/family discussion:    Quality measures:  HOB: >30 degrees  NIHSS: _ (Numerical Score)  ICH Score: 1  SAH Score (Dailey or Munguia & Garcias):_  Nimodipine for aSAH: _  Peridex mouthwash: _  Tidal volume:  _  Sedation/Analgesia: _  SAT: _  SBT: _  RASS: _  CAM: _  DVT prophy: _  Swallow eval within 24 hrs: _  SUP: _  Nutrition: _  Lines: _  Mobility therapies: PT/OT/Speech or Not appropriate    Critical care time 45 mins  Critical care diagnosis- ich, cerebral edema, htn                         Electronically Signed On 07/22/2018 02:26  __________________________________________________   ROBERT HIDALGO     No

## 2021-01-28 NOTE — OB NEONATOLOGY/PEDIATRICIAN DELIVERY SUMMARY - NSPEDSNEONOTESA_OBGYN_ALL_OB_FT
I was ask to attend this primary C/S sec high BP and Breech. Mom was seen in clinic today and b/c of high BPs was transferred for C/S ,Schedule for 21. Mom is 38 year old , O+ ,HIV negative,RPR NR, Rubella immune. GBS Negative. COVID Negative. Light meconium noted at time of delivery, baby born with spontaneous cry,dried,suctioned and stimulated. Apgar 9 & 9. Physical exam is remarkable for hyperextended leg bilaterally (breech presentation),otherwise unremarkable.  Observe in transitional care if remain stable transfer to NBN .   hIP us AT 4-6 Week of age. I was ask to attend this primary C/S sec high BP and Breech. Mom was seen in clinic today and b/c of high BPs was transferred for C/S ,Schedule for 21. Mom is 38 year old , O+ ,HIV negative,RPR NR, Rubella immune. GBS Negative. COVID Negative. Mother is Hepatitis BsAg + (as per ) Light meconium noted at time of delivery, baby born with spontaneous cry,dried,suctioned and stimulated. Apgar 9 & 9. Physical exam is remarkable for hyperextended leg bilaterally (breech presentation),otherwise unremarkable.  Observe in transitional care if remain stable transfer to NBN .   HiP us AT 4-6 Week of age.  Hepatitis vaccine and immunoglobulin to baby .

## 2021-01-28 NOTE — OB PROVIDER H&P - ASSESSMENT
A/P: 40 year old  at 39 weeks GA by first trimester ultrasound who is being admitted to L&D for primary C/S in setting of breech presentation and preeclampsia with severe features on MgSO4.   1. Preoperative  -Admit to L&D  -Consent  -Admission, PIH and COVID labs pending  -NPO, last PO at 0630   -IV fluids  -Ancef 2g ordered  -Fetus: Cat I tracing. Continuous toco and fetal monitoring.   -GBS: Negative, no GBS ppx required   -DVT ppx: Ambulate and SCD's while in bed     2. PECwSF based off of severe range blood pressures   -Elevated blood pressures, otherwise VSS  -Labetalol 20mg IVP given, 155/81 @1020  -MgSO4 started at 1012  -Magnesium checks q6 hours   -Magnesium serum level q4 hours after loading dose     Discussed with Dr. Scott.

## 2021-01-28 NOTE — OB PROVIDER H&P - NSHPLABSRESULTS_GEN_ALL_CORE
13.6   8.77  )-----------( 200      ( 28 Jan 2021 10:44 )             39.2     01-28    136  |  102  |  10.0  ----------------------------<  81  3.8   |  20.0<L>  |  0.51    Ca    9.0      28 Jan 2021 12:30    TPro  7.0  /  Alb  4.0  /  TBili  0.3<L>  /  DBili  x   /  AST  15  /  ALT  8   /  AlkPhos  166<H>  01-28    COVID-19 PCR: NotDetec: You can help in the fight against COVID-19. [facility] may contact  you to see if you are interested in voluntarily participating in one of  our clinical trials.  Testing is performed using polymerase chain reaction (PCR) or  transcription mediated amplification (TMA). This COVID-19 (SARS-CoV-2)  nucleic acid amplification test was validated by [facility] and is  in use under the FDA Emergency Use Authorization (EUA) for clinical labs  CLIA-certified to perform high complexity testing. Test results should be  correlated with clinical presentation, patient history, and epidemiology. (01.28.21 @ 10:52)

## 2021-01-28 NOTE — OB PROVIDER H&P - ATTENDING COMMENTS
pt seen; 39 weeks p0 with preecclampsia with severe features/breech presentation for  section.  Pt also has LORELEI fibroid.  Risks/benefits reviewed with patient; possible need for classical  discussed with patient pending location of fibroid

## 2021-01-28 NOTE — CHART NOTE - NSCHARTNOTEFT_GEN_A_CORE
Subjective:  Patient evaluated at bedside for clinical magnesium check. Serum magnesium to be drawn at ___.  She denies visual disturbances including scotoma, headache and right upper quadrant pain. Also denies nausea/vomiting/epigastric pain/chest pain/ shortness of breath. Pain well controlled.     Objective:  T(C): 37.1 (21 @ 21:11), Max: 37.1 (21 @ 20:15)  HR: 67 (21 @ 21:11) (61 - 88)  BP: 136/81 (21 @ 21:11) (97/51 - 166/104)  RR: 16 (21 @ 21:11) (14 - 20)  SpO2: 98% (21 @ 21:11) (97% - 100%)  Wt(kg): --  Daily Height in cm: 167.64 (2021 11:01)    Daily Weight Pre-pregnancy in k (2021 11:01)     @ 07:01  -   @ 21:56  --------------------------------------------------------  IN: 2100 mL / OUT: 1828 mL / NET: 272 mL        Gen: NAD, AAOx3  CV: RRR, no M/R/G  Pulm: CTAB, no R/R/W  Abd: soft, nontender, no rebound or guarding, no epigastric tenderness, liver nonpalpable +BS, fundus palpated   : Clark in place  Ext: +1 edema cinthia, SCDs in place, Reflexes ___                          13.6   8.77  )-----------( 200      ( 2021 10:44 )             39.2         136  |  102  |  10.0  ----------------------------<  81  3.8   |  20.0<L>  |  0.51    Ca    9.0      2021 12:30  Mg     3.9         TPro  7.0  /  Alb  4.0  /  TBili  0.3<L>  /  DBili  x   /  AST  15  /  ALT  8   /  AlkPhos  166<H>      acetaminophen   Tablet .. 975 milliGRAM(s) Oral <User Schedule>  butorphanol Injectable 0.25 milliGRAM(s) IV Push every 6 hours PRN  diphenhydrAMINE 25 milliGRAM(s) Oral every 6 hours PRN  diphtheria/tetanus/pertussis (acellular) Vaccine (ADAcel) 0.5 milliLiter(s) IntraMuscular once  enoxaparin Injectable 40 milliGRAM(s) SubCutaneous every 24 hours  ibuprofen  Tablet. 600 milliGRAM(s) Oral every 6 hours  ketorolac   Injectable 30 milliGRAM(s) IV Push every 6 hours  lactated ringers. 1000 milliLiter(s) IV Continuous <Continuous>  lanolin Ointment 1 Application(s) Topical every 6 hours PRN  magnesium hydroxide Suspension 30 milliLiter(s) Oral two times a day PRN  magnesium sulfate Infusion 2.5 Gm/Hr IV Continuous <Continuous>  oxyCODONE    IR 5 milliGRAM(s) Oral every 3 hours PRN  oxyCODONE    IR 5 milliGRAM(s) Oral once PRN  oxytocin Infusion 333.333 milliUNIT(s)/Min IV Continuous <Continuous>  simethicone 80 milliGRAM(s) Chew every 4 hours PRN      21 @ 07:  -  21 @ 21:56  --------------------------------------------------------  IN: 2100 mL / OUT: 1828 mL / NET: 272 mL Subjective:  Patient evaluated at bedside for clinical magnesium check. Serum magnesium to be drawn at 2300.  She denies visual disturbances including scotoma, headache and right upper quadrant pain. Also denies epigastric pain/chest pain/ shortness of breath. Pain well controlled. She reports recent nausea and vomiting since arriving to her room on 2 [address].     Objective:  T(C): 37.1 (21 @ 21:11), Max: 37.1 (21 @ 20:15)  HR: 67 (21 @ 21:11) (61 - 88)  BP: 136/81 (21 @ 21:11) (97/51 - 166/104)  RR: 16 (21 @ 21:11) (14 - 20)  SpO2: 98% (21 @ 21:11) (97% - 100%)    Daily Height in cm: 167.64 (2021 11:01)    Daily Weight Pre-pregnancy in k (2021 11:01)     @ 07:01  -   @ 21:56  --------------------------------------------------------  IN: 2100 mL / OUT: 1828 mL / NET: 272 mL  UOP: 150cc/ last hour      Gen: NAD, AAOx3  CV: RRR, no M/R/G  Pulm: CTAB, no R/R/W  Abd: soft, nontender, no rebound or guarding, no epigastric tenderness, liver nonpalpable +BS, fundus palpated   : Rodríguez in place  Ext: +1 edema cinthia, SCDs in place, Reflexes 2+                          13.6   8.77  )-----------( 200      ( 2021 10:44 )             39.2         136  |  102  |  10.0  ----------------------------<  81  3.8   |  20.0<L>  |  0.51    Ca    9.0      2021 12:30  Mg     3.9         TPro  7.0  /  Alb  4.0  /  TBili  0.3<L>  /  DBili  x   /  AST  15  /  ALT  8   /  AlkPhos  166<H>      acetaminophen   Tablet .. 975 milliGRAM(s) Oral <User Schedule>  butorphanol Injectable 0.25 milliGRAM(s) IV Push every 6 hours PRN  diphenhydrAMINE 25 milliGRAM(s) Oral every 6 hours PRN  diphtheria/tetanus/pertussis (acellular) Vaccine (ADAcel) 0.5 milliLiter(s) IntraMuscular once  enoxaparin Injectable 40 milliGRAM(s) SubCutaneous every 24 hours  ibuprofen  Tablet. 600 milliGRAM(s) Oral every 6 hours  ketorolac   Injectable 30 milliGRAM(s) IV Push every 6 hours  lactated ringers. 1000 milliLiter(s) IV Continuous <Continuous>  lanolin Ointment 1 Application(s) Topical every 6 hours PRN  magnesium hydroxide Suspension 30 milliLiter(s) Oral two times a day PRN  magnesium sulfate Infusion 2.5 Gm/Hr IV Continuous <Continuous>  oxyCODONE    IR 5 milliGRAM(s) Oral every 3 hours PRN  oxyCODONE    IR 5 milliGRAM(s) Oral once PRN  oxytocin Infusion 333.333 milliUNIT(s)/Min IV Continuous <Continuous>  simethicone 80 milliGRAM(s) Chew every 4 hours PRN    A/P: Pt is a 40 y.o.  POD#0 s/p primary  section for breech presentation w/ preeclampsia with severe features on magnesium. She was pushed labetalol 20mg IV before delivery. Her first serum magnesium was 3.9, so her magnesium was increased to 2.5mg/hr.     - nausea and vomiting more likely due to anesthesia  - no signs of magnesium toxicity currently, will continue to monitor  - BP normotensive  - good UOP, will continue to monitor via rodríguez  - last magnesium serum was 3.9, next mag check at 2300  - continue routine postpartum care      Discussed with Dr. Benedict

## 2021-01-28 NOTE — OB RN DELIVERY SUMMARY - NS_SEPSISRSKCALC_OBGYN_ALL_OB_FT
EOS calculated successfully. EOS Risk Factor: 0.5/1000 live births ([facility] national incidence); GA=39w;Temp=98; ROM=0.017; GBS='Negative'; Antibiotics='No antibiotics or any antibiotics < 2 hrs prior to birth'

## 2021-01-29 ENCOUNTER — RESULT REVIEW (OUTPATIENT)
Age: 41
End: 2021-01-29

## 2021-01-29 ENCOUNTER — TRANSCRIPTION ENCOUNTER (OUTPATIENT)
Age: 41
End: 2021-01-29

## 2021-01-29 LAB
BASOPHILS # BLD AUTO: 0.06 K/UL — SIGNIFICANT CHANGE UP (ref 0–0.2)
BASOPHILS NFR BLD AUTO: 0.4 % — SIGNIFICANT CHANGE UP (ref 0–2)
EOSINOPHIL # BLD AUTO: 0.06 K/UL — SIGNIFICANT CHANGE UP (ref 0–0.5)
EOSINOPHIL NFR BLD AUTO: 0.4 % — SIGNIFICANT CHANGE UP (ref 0–6)
HCT VFR BLD CALC: 35.4 % — SIGNIFICANT CHANGE UP (ref 34.5–45)
HGB BLD-MCNC: 11.7 G/DL — SIGNIFICANT CHANGE UP (ref 11.5–15.5)
IMM GRANULOCYTES NFR BLD AUTO: 0.6 % — SIGNIFICANT CHANGE UP (ref 0–1.5)
LYMPHOCYTES # BLD AUTO: 1.83 K/UL — SIGNIFICANT CHANGE UP (ref 1–3.3)
LYMPHOCYTES # BLD AUTO: 13.1 % — SIGNIFICANT CHANGE UP (ref 13–44)
MAGNESIUM SERPL-MCNC: 5.5 MG/DL — HIGH (ref 1.6–2.6)
MAGNESIUM SERPL-MCNC: 6.3 MG/DL — HIGH (ref 1.6–2.6)
MAGNESIUM SERPL-MCNC: 6.7 MG/DL — HIGH (ref 1.8–2.6)
MCHC RBC-ENTMCNC: 29.3 PG — SIGNIFICANT CHANGE UP (ref 27–34)
MCHC RBC-ENTMCNC: 33.1 GM/DL — SIGNIFICANT CHANGE UP (ref 32–36)
MCV RBC AUTO: 88.5 FL — SIGNIFICANT CHANGE UP (ref 80–100)
MONOCYTES # BLD AUTO: 1.01 K/UL — HIGH (ref 0–0.9)
MONOCYTES NFR BLD AUTO: 7.2 % — SIGNIFICANT CHANGE UP (ref 2–14)
NEUTROPHILS # BLD AUTO: 10.9 K/UL — HIGH (ref 1.8–7.4)
NEUTROPHILS NFR BLD AUTO: 78.3 % — HIGH (ref 43–77)
PLATELET # BLD AUTO: 193 K/UL — SIGNIFICANT CHANGE UP (ref 150–400)
RBC # BLD: 4 M/UL — SIGNIFICANT CHANGE UP (ref 3.8–5.2)
RBC # FLD: 13.9 % — SIGNIFICANT CHANGE UP (ref 10.3–14.5)
WBC # BLD: 13.95 K/UL — HIGH (ref 3.8–10.5)
WBC # FLD AUTO: 13.95 K/UL — HIGH (ref 3.8–10.5)

## 2021-01-29 PROCEDURE — 88307 TISSUE EXAM BY PATHOLOGIST: CPT | Mod: 26

## 2021-01-29 RX ORDER — IBUPROFEN 200 MG
600 TABLET ORAL EVERY 6 HOURS
Refills: 0 | Status: DISCONTINUED | OUTPATIENT
Start: 2021-01-29 | End: 2021-01-31

## 2021-01-29 RX ORDER — IBUPROFEN 200 MG
1 TABLET ORAL
Qty: 30 | Refills: 0
Start: 2021-01-29

## 2021-01-29 RX ADMIN — Medication 975 MILLIGRAM(S): at 09:18

## 2021-01-29 RX ADMIN — Medication 975 MILLIGRAM(S): at 21:07

## 2021-01-29 RX ADMIN — ENOXAPARIN SODIUM 40 MILLIGRAM(S): 100 INJECTION SUBCUTANEOUS at 02:53

## 2021-01-29 RX ADMIN — Medication 600 MILLIGRAM(S): at 17:50

## 2021-01-29 RX ADMIN — Medication 30 MILLIGRAM(S): at 05:20

## 2021-01-29 RX ADMIN — Medication 30 MILLIGRAM(S): at 12:23

## 2021-01-29 RX ADMIN — Medication 62.5 GM/HR: at 11:12

## 2021-01-29 RX ADMIN — Medication 975 MILLIGRAM(S): at 15:18

## 2021-01-29 RX ADMIN — SODIUM CHLORIDE 62.5 MILLILITER(S): 9 INJECTION, SOLUTION INTRAVENOUS at 00:16

## 2021-01-29 RX ADMIN — Medication 975 MILLIGRAM(S): at 02:53

## 2021-01-29 NOTE — DISCHARGE NOTE OB - CARE PLAN
Principal Discharge DX:	 delivery delivered  Goal:	rapid recovery  Assessment and plan of treatment:	Take medications as directed, regular diet, activity as tolerated. Exclusive breast feeding for the first 6 months is recommended. Nothing per vagina for 6 weeks (incl. sex, douching, etc). If you have additional concerns, please inform your provider.  Secondary Diagnosis:	Severe pre-eclampsia in third trimester  Assessment and plan of treatment:	Patient should transition to regular activity level. Resume regular diet. Patient should follow up with her OB for a blood pressure check and wound check in 1 week. Patient should call her doctor sooner if she develops  uncontrolled vaginal bleeding, fever, uncontrolled headache, vision changes. Please call sooner if there are any other concerns.

## 2021-01-29 NOTE — DISCHARGE NOTE OB - PLAN OF CARE
rapid recovery Take medications as directed, regular diet, activity as tolerated. Exclusive breast feeding for the first 6 months is recommended. Nothing per vagina for 6 weeks (incl. sex, douching, etc). If you have additional concerns, please inform your provider. Patient should transition to regular activity level. Resume regular diet. Patient should follow up with her OB for a blood pressure check and wound check in 1 week. Patient should call her doctor sooner if she develops  uncontrolled vaginal bleeding, fever, uncontrolled headache, vision changes. Please call sooner if there are any other concerns.

## 2021-01-29 NOTE — DISCHARGE NOTE OB - PATIENT PORTAL LINK FT
You can access the FollowMyHealth Patient Portal offered by [facility] by registering at the following website: [email or web address]. By joining [employer]s FollowMyHealth portal, you will also be able to view your health information using other applications (apps) compatible with our system.

## 2021-01-29 NOTE — DISCHARGE NOTE OB - HOSPITAL COURSE
Pt is a 39yo  s/p primary  section for breech presentation complicated by preeclampsia with severe features. She completed 24 hour course of magnesium sulfate. She was transferred to postpartum unit without complications during her stay. Upon discharge she is voiding, tolerating PO, ambulating, and pain is well controlled.                          11.7   13.95 )-----------( 193      ( 2021 06:40 )             35.4    Pt is a 41yo  s/p primary  section for breech presentation complicated by preeclampsia with severe features. She completed 24 hour course of magnesium sulfate. She was transferred to postpartum unit without complications during her stay. Upon discharge she is voiding, tolerating PO, ambulating, and pain is well controlled. Her blood pressures were controlled with Procardia 60 mg daily and Labetalol 200mg every 12 hours. Patient must make an appointment with her OBGYN for a blood pressure check in 3 days.                           11.7   13.95 )-----------( 193      ( 2021 06:40 )             35.4    Pt is a 39yo  s/p primary  section for breech presentation complicated by preeclampsia with severe features. She completed 24 hour course of magnesium sulfate. She was transferred to postpartum unit without complications during her stay. Upon discharge she is voiding, tolerating PO, ambulating, and pain is well controlled. Her blood pressures were controlled with Procardia 60 mg daily and Labetalol 200mg every 8 hours. Patient must make an appointment with her OBGYN for a blood pressure check in 3 days.                           11.7   13.95 )-----------( 193      ( 2021 06:40 )             35.4

## 2021-01-29 NOTE — DISCHARGE NOTE OB - PROVIDER TOKENS
PROVIDER:[TOKEN:[[id number]]] PROVIDER:[TOKEN:[[id number]],FOLLOWUP:[1-3 days],ESTABLISHEDPATIENT:[T]]

## 2021-01-29 NOTE — CHART NOTE - NSCHARTNOTEFT_GEN_A_CORE
Patient doing well seen at bedside for mag check     S:   Denies headache, vision changes, epigastric pain   Noticed that she had some tingling in her hands with some mild edema     O:   Vital Signs Last 24 Hrs  T(C): 36.6 (29 Jan 2021 01:00), Max: 37.1 (28 Jan 2021 20:15)  T(F): 97.9 (29 Jan 2021 01:00), Max: 98.8 (28 Jan 2021 20:15)  HR: 66 (29 Jan 2021 01:00) (61 - 88)  BP: 143/84 (29 Jan 2021 01:00) (97/51 - 166/104)  RR: 18 (29 Jan 2021 01:00) (14 - 20)  SpO2: 98% (29 Jan 2021 01:00) (97% - 100%)    CV: RRR   Lungs: CTA   Abdomen: soft, nontender   Reflexes: 2+     A/P   Patient doing well s/p pCS for breech presentation diagnosed with PEC with severe features at term currently on magnesium sulfate for seizure prophylaxis     Plan:   [] BPs mild-moderate range   [] Will continue magnesium sulfate, it is within therapeutic range without signs/sx of toxicity

## 2021-01-29 NOTE — DISCHARGE NOTE OB - TEMPERATURE GREATER THAN 100.0  F ORALLY
Spine appears normal, range of motion is not limited, no muscle or joint tenderness, no edema and no calf tenderness.
Statement Selected

## 2021-01-29 NOTE — DISCHARGE NOTE OB - CARE PROVIDER_API CALL
Manuel James)  Obstetrics and Gynecology  [address] [place] [address]  Phone: [phone]  Fax: [phone]  Follow Up Time:    Manuel James)  Obstetrics and Gynecology  [address] [place] [address]  Phone: [phone]  Fax: [phone]  Established Patient  Follow Up Time: 1-3 days

## 2021-01-29 NOTE — DISCHARGE NOTE OB - MEDICATION SUMMARY - MEDICATIONS TO TAKE
I will START or STAY ON the medications listed below when I get home from the hospital:    Prenatal vitamins  -- once a day  -- Indication: For home med    Iron  -- once a day  -- Indication: For home med    ibuprofen 600 mg oral tablet  -- 1 tab(s) by mouth every 6 hours MDD:4  -- Indication: For Moderate pain    I will START or STAY ON the medications listed below when I get home from the hospital:    Prenatal vitamins  -- once a day  -- Indication: For home med    Iron  -- once a day  -- Indication: For home med    ibuprofen 600 mg oral tablet  -- 1 tab(s) by mouth every 6 hours MDD:4  -- Indication: For Moderate pain     oxycodone-acetaminophen 5 mg-325 mg oral tablet  -- 1 tab(s) by mouth every 6 hours MDD:4 tabs  -- Caution federal law prohibits the transfer of this drug to any person other  than the person for whom it was prescribed.  May cause drowsiness.  Alcohol may intensify this effect.  Use care when operating dangerous machinery.  This prescription cannot be refilled.  This product contains acetaminophen.  Do not use  with any other product containing acetaminophen to prevent possible liver damage.  Using more of this medication than prescribed may cause serious breathing problems.    -- Indication: For Severe pain    I will START or STAY ON the medications listed below when I get home from the hospital:    Prenatal vitamins  -- once a day  -- Indication: For home med    Iron  -- once a day  -- Indication: For home med    oxycodone-acetaminophen 5 mg-325 mg oral tablet  -- 1 tab(s) by mouth every 6 hours MDD:4 tabs  -- Caution federal law prohibits the transfer of this drug to any person other  than the person for whom it was prescribed.  May cause drowsiness.  Alcohol may intensify this effect.  Use care when operating dangerous machinery.  This prescription cannot be refilled.  This product contains acetaminophen.  Do not use  with any other product containing acetaminophen to prevent possible liver damage.  Using more of this medication than prescribed may cause serious breathing problems.    -- Indication: For pain control    ibuprofen 600 mg oral tablet  -- 1 tab(s) by mouth every 6 hours MDD:4  -- Indication: For pain control    labetalol 200 mg oral tablet  -- 1 tab(s) by mouth every 12 hours   -- Indication: For hypertension    NIFEdipine 60 mg oral tablet, extended release  -- 1 tab(s) by mouth once a day in the morning  -- Indication: For hypertension   I will START or STAY ON the medications listed below when I get home from the hospital:    Prenatal vitamins  -- once a day  -- Indication: For home med    Iron  -- once a day  -- Indication: For home med    oxycodone-acetaminophen 5 mg-325 mg oral tablet  -- 1 tab(s) by mouth every 6 hours MDD:4 tabs  -- Caution federal law prohibits the transfer of this drug to any person other  than the person for whom it was prescribed.  May cause drowsiness.  Alcohol may intensify this effect.  Use care when operating dangerous machinery.  This prescription cannot be refilled.  This product contains acetaminophen.  Do not use  with any other product containing acetaminophen to prevent possible liver damage.  Using more of this medication than prescribed may cause serious breathing problems.    -- Indication: For pain control    ibuprofen 600 mg oral tablet  -- 1 tab(s) by mouth every 6 hours MDD:4  -- Indication: For pain control    labetalol 200 mg oral tablet  -- 1 tab(s) by mouth every 8 hours   -- Indication: For hypertension    NIFEdipine 60 mg oral tablet, extended release  -- 1 tab(s) by mouth once a day in the morning  -- Indication: For hypertension    Augmentin 875 mg-125 mg oral tablet  -- 1 tab(s) by mouth 12 times a day   -- Finish all this medication unless otherwise directed by prescriber.  Take with food or milk.    -- Indication: For endometritis prophylaxis    I will START or STAY ON the medications listed below when I get home from the hospital:    Prenatal vitamins  -- once a day  -- Indication: For home med    Iron  -- once a day  -- Indication: For home med    oxycodone-acetaminophen 5 mg-325 mg oral tablet  -- 1 tab(s) by mouth every 6 hours MDD:4 tabs  -- Caution federal law prohibits the transfer of this drug to any person other  than the person for whom it was prescribed.  May cause drowsiness.  Alcohol may intensify this effect.  Use care when operating dangerous machinery.  This prescription cannot be refilled.  This product contains acetaminophen.  Do not use  with any other product containing acetaminophen to prevent possible liver damage.  Using more of this medication than prescribed may cause serious breathing problems.    -- Indication: For pain control    ibuprofen 600 mg oral tablet  -- 1 tab(s) by mouth every 6 hours MDD:4  -- Indication: For pain control    labetalol 200 mg oral tablet  -- 1 tab(s) by mouth every 8 hours   -- Indication: For hypertension    NIFEdipine 60 mg oral tablet, extended release  -- 1 tab(s) by mouth once a day in the morning  -- Indication: For hypertension    Augmentin 875 mg-125 mg oral tablet  -- 1 tab(s) by mouth 2 times a day   -- Finish all this medication unless otherwise directed by prescriber.  Take with food or milk.    -- Indication: For endometritis prophylaxis    I will START or STAY ON the medications listed below when I get home from the hospital:    Prenatal vitamins  -- once a day  -- Indication: For home med    Iron  -- once a day  -- Indication: For home med    blood pressure monitor  -- 1 packet(s)  2 times a day   -- Indication: For hypertension    oxycodone-acetaminophen 5 mg-325 mg oral tablet  -- 1 tab(s) by mouth every 6 hours MDD:4 tabs  -- Caution federal law prohibits the transfer of this drug to any person other  than the person for whom it was prescribed.  May cause drowsiness.  Alcohol may intensify this effect.  Use care when operating dangerous machinery.  This prescription cannot be refilled.  This product contains acetaminophen.  Do not use  with any other product containing acetaminophen to prevent possible liver damage.  Using more of this medication than prescribed may cause serious breathing problems.    -- Indication: For pain control    ibuprofen 600 mg oral tablet  -- 1 tab(s) by mouth every 6 hours MDD:4  -- Indication: For pain control    labetalol 200 mg oral tablet  -- 1 tab(s) by mouth every 8 hours   -- Indication: For hypertension    NIFEdipine 60 mg oral tablet, extended release  -- 1 tab(s) by mouth once a day in the morning  -- Indication: For hypertension    Augmentin 875 mg-125 mg oral tablet  -- 1 tab(s) by mouth 2 times a day   -- Finish all this medication unless otherwise directed by prescriber.  Take with food or milk.    -- Indication: For endometritis prophylaxis

## 2021-01-30 RX ORDER — NIFEDIPINE 30 MG
60 TABLET, EXTENDED RELEASE 24 HR ORAL DAILY
Refills: 0 | Status: DISCONTINUED | OUTPATIENT
Start: 2021-01-31 | End: 2021-01-31

## 2021-01-30 RX ORDER — OXYCODONE HYDROCHLORIDE 5 MG/1
5 TABLET ORAL ONCE
Refills: 0 | Status: DISCONTINUED | OUTPATIENT
Start: 2021-01-30 | End: 2021-01-30

## 2021-01-30 RX ORDER — BUTORPHANOL TARTRATE 2 MG/ML
1 INJECTION, SOLUTION INTRAMUSCULAR; INTRAVENOUS ONCE
Refills: 0 | Status: DISCONTINUED | OUTPATIENT
Start: 2021-01-30 | End: 2021-01-30

## 2021-01-30 RX ORDER — LABETALOL HCL 100 MG
20 TABLET ORAL ONCE
Refills: 0 | Status: COMPLETED | OUTPATIENT
Start: 2021-01-30 | End: 2021-01-30

## 2021-01-30 RX ORDER — LABETALOL HCL 100 MG
1 TABLET ORAL
Qty: 60 | Refills: 0
Start: 2021-01-30 | End: 2021-02-28

## 2021-01-30 RX ORDER — LABETALOL HCL 100 MG
200 TABLET ORAL
Refills: 0 | Status: DISCONTINUED | OUTPATIENT
Start: 2021-01-30 | End: 2021-01-31

## 2021-01-30 RX ORDER — NIFEDIPINE 30 MG
60 TABLET, EXTENDED RELEASE 24 HR ORAL DAILY
Refills: 0 | Status: DISCONTINUED | OUTPATIENT
Start: 2021-01-30 | End: 2021-01-30

## 2021-01-30 RX ORDER — OXYCODONE HYDROCHLORIDE 5 MG/1
5 TABLET ORAL
Refills: 0 | Status: DISCONTINUED | OUTPATIENT
Start: 2021-01-30 | End: 2021-02-01

## 2021-01-30 RX ORDER — HYDRALAZINE HCL 50 MG
5 TABLET ORAL ONCE
Refills: 0 | Status: COMPLETED | OUTPATIENT
Start: 2021-01-30 | End: 2021-01-30

## 2021-01-30 RX ORDER — NIFEDIPINE 30 MG
30 TABLET, EXTENDED RELEASE 24 HR ORAL ONCE
Refills: 0 | Status: COMPLETED | OUTPATIENT
Start: 2021-01-30 | End: 2021-01-30

## 2021-01-30 RX ORDER — LABETALOL HCL 100 MG
40 TABLET ORAL ONCE
Refills: 0 | Status: COMPLETED | OUTPATIENT
Start: 2021-01-30 | End: 2021-01-30

## 2021-01-30 RX ORDER — KETOROLAC TROMETHAMINE 30 MG/ML
30 SYRINGE (ML) INJECTION ONCE
Refills: 0 | Status: DISCONTINUED | OUTPATIENT
Start: 2021-01-30 | End: 2021-01-30

## 2021-01-30 RX ORDER — IBUPROFEN 200 MG
1 TABLET ORAL
Qty: 30 | Refills: 0
Start: 2021-01-30

## 2021-01-30 RX ORDER — DIPHENHYDRAMINE HCL 50 MG
25 CAPSULE ORAL ONCE
Refills: 0 | Status: COMPLETED | OUTPATIENT
Start: 2021-01-30 | End: 2021-01-30

## 2021-01-30 RX ORDER — NIFEDIPINE 30 MG
30 TABLET, EXTENDED RELEASE 24 HR ORAL EVERY 24 HOURS
Refills: 0 | Status: DISCONTINUED | OUTPATIENT
Start: 2021-01-30 | End: 2021-01-30

## 2021-01-30 RX ORDER — NIFEDIPINE 30 MG
1 TABLET, EXTENDED RELEASE 24 HR ORAL
Qty: 30 | Refills: 0
Start: 2021-01-30 | End: 2021-02-28

## 2021-01-30 RX ADMIN — Medication 30 MILLIGRAM(S): at 15:40

## 2021-01-30 RX ADMIN — OXYCODONE HYDROCHLORIDE 5 MILLIGRAM(S): 5 TABLET ORAL at 21:21

## 2021-01-30 RX ADMIN — Medication 200 MILLIGRAM(S): at 14:28

## 2021-01-30 RX ADMIN — Medication 600 MILLIGRAM(S): at 06:04

## 2021-01-30 RX ADMIN — Medication 20 MILLIGRAM(S): at 13:40

## 2021-01-30 RX ADMIN — Medication 975 MILLIGRAM(S): at 14:31

## 2021-01-30 RX ADMIN — BUTORPHANOL TARTRATE 1 MILLIGRAM(S): 2 INJECTION, SOLUTION INTRAMUSCULAR; INTRAVENOUS at 16:30

## 2021-01-30 RX ADMIN — Medication 975 MILLIGRAM(S): at 07:37

## 2021-01-30 RX ADMIN — Medication 40 MILLIGRAM(S): at 14:00

## 2021-01-30 RX ADMIN — Medication 975 MILLIGRAM(S): at 21:21

## 2021-01-30 RX ADMIN — Medication 600 MILLIGRAM(S): at 00:17

## 2021-01-30 RX ADMIN — Medication 30 MILLIGRAM(S): at 01:33

## 2021-01-30 RX ADMIN — OXYCODONE HYDROCHLORIDE 5 MILLIGRAM(S): 5 TABLET ORAL at 07:37

## 2021-01-30 RX ADMIN — OXYCODONE HYDROCHLORIDE 5 MILLIGRAM(S): 5 TABLET ORAL at 11:42

## 2021-01-30 RX ADMIN — Medication 25 MILLIGRAM(S): at 16:05

## 2021-01-30 RX ADMIN — Medication 5 MILLIGRAM(S): at 03:35

## 2021-01-30 RX ADMIN — OXYCODONE HYDROCHLORIDE 5 MILLIGRAM(S): 5 TABLET ORAL at 01:19

## 2021-01-30 RX ADMIN — Medication 30 MILLIGRAM(S): at 12:39

## 2021-01-30 RX ADMIN — Medication 20 MILLIGRAM(S): at 01:05

## 2021-01-30 NOTE — CHART NOTE - NSCHARTNOTEFT_GEN_A_CORE
Per nursing patient unable to tolerate IV medications due to blown IV.   At this time will not cycle BP at this time given delay in administering medications.   Will replace IV at this time, settle with medications then repeat BP reading      [facility] [id number]  D/w Dr. Schmitt

## 2021-01-30 NOTE — CHART NOTE - NSCHARTNOTEFT_GEN_A_CORE
Patient continuing to have labile blood pressures.   Her treatment course today has been as follows:    Procardia 30mg XL at around 2am  Procardia 30mg XL given at 12pm for moderate range BPs  Labetalol 20 IVP given for severe range BPs  Labetalol 40 IVP given for severe range BPs  Labetalol 200 BID started for continued severe range blood pressures  Blood pressure continued to be elevated  Patient is anxious and sleep-deprived, feeling she is behind on pain control, getting more anxious with continuous BP checks  Team discussion had and decision made to provide therapeutic rest for patient with stadol and benedryl  Will recheck BP in an hour    Plan D/w Dr. Schmitt

## 2021-01-30 NOTE — CHART NOTE - NSCHARTNOTEFT_GEN_A_CORE
MD notified that patient is having severe range BPs in 160s x2. She compelted course of magnesium sulfate and has been off for approximately 10 hours. Brought down to L&D for BP monitoring and following HTN protocol.   Given 1 dose of 20mg IV push labetalol. Will start 30mg XR Procardia with parameters.   Pt also in 8/10 pain, has not received narcotics only motrin and Tylenol. Will give dose of Oxy STAT.    Dr. Cesar in agreement with plan

## 2021-01-30 NOTE — PROVIDER CONTACT NOTE (MEDICATION) - ASSESSMENT
Pt denies visual disturbances or epigastic pain. Pt c/o painful swelling in her hands. PO Labetotol given @ 14:30

## 2021-01-31 VITALS
TEMPERATURE: 98 F | SYSTOLIC BLOOD PRESSURE: 114 MMHG | DIASTOLIC BLOOD PRESSURE: 66 MMHG | RESPIRATION RATE: 16 BRPM | HEART RATE: 82 BPM | OXYGEN SATURATION: 98 %

## 2021-01-31 RX ORDER — SIMETHICONE 80 MG/1
80 TABLET, CHEWABLE ORAL THREE TIMES A DAY
Refills: 0 | Status: DISCONTINUED | OUTPATIENT
Start: 2021-01-31 | End: 2021-01-31

## 2021-01-31 RX ORDER — NIFEDIPINE 30 MG
60 TABLET, EXTENDED RELEASE 24 HR ORAL EVERY 24 HOURS
Refills: 0 | Status: DISCONTINUED | OUTPATIENT
Start: 2021-01-31 | End: 2021-02-01

## 2021-01-31 RX ORDER — KETOROLAC TROMETHAMINE 30 MG/ML
10 SYRINGE (ML) INJECTION EVERY 6 HOURS
Refills: 0 | Status: DISCONTINUED | OUTPATIENT
Start: 2021-01-31 | End: 2021-01-31

## 2021-01-31 RX ORDER — LABETALOL HCL 100 MG
200 TABLET ORAL EVERY 8 HOURS
Refills: 0 | Status: DISCONTINUED | OUTPATIENT
Start: 2021-01-31 | End: 2021-02-01

## 2021-01-31 RX ORDER — IBUPROFEN 200 MG
600 TABLET ORAL EVERY 6 HOURS
Refills: 0 | Status: DISCONTINUED | OUTPATIENT
Start: 2021-01-31 | End: 2021-02-01

## 2021-01-31 RX ORDER — SIMETHICONE 80 MG/1
80 TABLET, CHEWABLE ORAL THREE TIMES A DAY
Refills: 0 | Status: DISCONTINUED | OUTPATIENT
Start: 2021-01-31 | End: 2021-02-01

## 2021-01-31 RX ADMIN — Medication 975 MILLIGRAM(S): at 14:49

## 2021-01-31 RX ADMIN — Medication 600 MILLIGRAM(S): at 11:08

## 2021-01-31 RX ADMIN — Medication 200 MILLIGRAM(S): at 06:19

## 2021-01-31 RX ADMIN — SIMETHICONE 80 MILLIGRAM(S): 80 TABLET, CHEWABLE ORAL at 11:52

## 2021-01-31 RX ADMIN — Medication 200 MILLIGRAM(S): at 23:01

## 2021-01-31 RX ADMIN — Medication 200 MILLIGRAM(S): at 18:05

## 2021-01-31 RX ADMIN — OXYCODONE HYDROCHLORIDE 5 MILLIGRAM(S): 5 TABLET ORAL at 17:15

## 2021-01-31 RX ADMIN — Medication 975 MILLIGRAM(S): at 08:08

## 2021-01-31 RX ADMIN — Medication 975 MILLIGRAM(S): at 21:12

## 2021-01-31 RX ADMIN — OXYCODONE HYDROCHLORIDE 5 MILLIGRAM(S): 5 TABLET ORAL at 06:30

## 2021-01-31 RX ADMIN — ENOXAPARIN SODIUM 40 MILLIGRAM(S): 100 INJECTION SUBCUTANEOUS at 04:13

## 2021-01-31 RX ADMIN — Medication 600 MILLIGRAM(S): at 00:24

## 2021-01-31 RX ADMIN — Medication 600 MILLIGRAM(S): at 06:20

## 2021-01-31 RX ADMIN — Medication 10 MILLIGRAM(S): at 13:57

## 2021-01-31 RX ADMIN — OXYCODONE HYDROCHLORIDE 5 MILLIGRAM(S): 5 TABLET ORAL at 11:51

## 2021-01-31 RX ADMIN — Medication 975 MILLIGRAM(S): at 04:13

## 2021-01-31 RX ADMIN — SIMETHICONE 80 MILLIGRAM(S): 80 TABLET, CHEWABLE ORAL at 21:12

## 2021-01-31 RX ADMIN — OXYCODONE HYDROCHLORIDE 5 MILLIGRAM(S): 5 TABLET ORAL at 02:01

## 2021-01-31 RX ADMIN — OXYCODONE HYDROCHLORIDE 5 MILLIGRAM(S): 5 TABLET ORAL at 22:15

## 2021-01-31 RX ADMIN — Medication 60 MILLIGRAM(S): at 14:46

## 2021-01-31 NOTE — PROGRESS NOTE ADULT - ASSESSMENT
A/P: 40y  now POD#1 s/p primary  section secondary to maureen breech presentation at 39 weeks gestation significant for PECwSF on MgSO4.   -Asymptomatic  -Vital Signs Stable  -Clark in place, UOP adequate  -Tolerating PO, bowel sounds present   -Heme: Hgb 13.6 --> AM labs pending   -Magnesium: 5.5, continue MgSO4 at 2.5g/hr  -Continue magnesium checks q6h  -Advance care as tolerated   -Continue routine postpartum/postoperative care and education  -Healthy female infant  -Dispo: Patient to be discharged when meeting all postpartum/postoperative milestones and pending attending approval. 
A/P: 40y  now POD#3 s/p primary  section secondary to maureen breech presentation at 39 weeks gestation significant for PECwSF s/p MgSO4.    -Vital Signs Stable  -Voiding, tolerating PO, bowel function nml   -Heme: Hgb 13.6 --> 11.7  -Advance care as tolerated   -Continue antihypertensive regimen   -Continue routine postpartum/postoperative care and education  -Healthy female infant  -Dispo: Pt is stable for discharge to home pending attending approval.

## 2021-01-31 NOTE — CHART NOTE - NSCHARTNOTEFT_GEN_A_CORE
Patient had blood pressure of 166/96 and HR 85. Patient's blood pressure remains poorly controlled on current blood pressure medication despite today's increase to 60 mg XL procardia today. Patient was breastfeeding at the time of the BP being taken. She got 200 mg labetalol PO at this time. Patient had difficulty tolerating frequent blood pressures earlier. Will retake blood pressure 15 minutes after patient done breastfeeding and determine if short-acting blood pressure medication needs to be given.       discussed with Dr. Ríos

## 2021-01-31 NOTE — CHART NOTE - NSCHARTNOTEFT_GEN_A_CORE
Patient seen and examined at bedside after blood pressure reading of 171/94  Patient with lactation consultant, resting comfortably. Patient is refusing Blood Pressure readings every 5 minutes. Patient is refusing IV access at this time.   Patient endorses inadequate pain control, but does not want to take too much pain medicine.   Patient counseled on risks and benefits of not following anti-hypertensive protocol.   Motrin d/c'd and toradol 10mg PO q6h started.   Will re-assess pain.     D/w Dr. Ríos.

## 2021-01-31 NOTE — OB POSTPARTUM EVENT NOTE - NS_EVENTFINDINGS1_OBGYN_ALL_OB_FT
Postpartum gestational hypertension with non-sustained severe range blood pressures now on Procardia XL 60 mg QD and Labetalol 200 mg BID  -emphasized the goal of adequate pain control with activity such as walking to the bathroom  -re-iterated that pain medications do not treat blood pressure and may delay adequate treatment for severe range BP  -patient and her  agreed to continue observation until her 6 pm Labetalol dose, and that if there are no sustained severe range blood pressures she can be discharged  -if she has severe range BP they agree to additional observation  -she is aware of the risk of postpartum pre-eclampsia with escalating hypertension and possible need for readmission  -AMA discussed and deferred at this time

## 2021-01-31 NOTE — OB POSTPARTUM EVENT NOTE - NS_EVENTSUMMARY1_OBGYN_ALL_OB_FT
Patient and her  are upset that her discharge is delayed due to severe range BP. Patient states that her blood pressure is elevated because she is in pain but that she does not want additional pain medication as she is comfortable at rest. She is frustrated that her noon-time blood pressure was taken when she was in pain and that it is not accurate. I explained to the patient and her partner that we are concerned about her severe range gestational hypertension and the risk for her of stroke and seizure. We have increased her Procardia XL from 30 to 60 mg and added Labetalol 200 mg BID but have not yet settled on the dose that will protect her from hypertension but not put her at risk from hypotension. I explained that pain medication dies not control blood pressure, and that she requires blood pressure control due to a number of causes including her gestational hypertension, pain, stress and frustration to prevent stroke.

## 2021-01-31 NOTE — PROGRESS NOTE ADULT - SUBJECTIVE AND OBJECTIVE BOX
FLO KINNEY is a 40y  now POD#3 s/p primary  section secondary to maureen breech presentation at 39 weeks gestation significant for PECwSF s/p MgSO4.     S:    The patient has no complaints.   Pain is controlled with current treatment regimen.   She is ambulating without difficulty and tolerating PO.   + flatus/-BM/+ voiding   She endorses appropriate lochia, which is decreasing.   Denies headaches, visual disturbances, RUQ pain, epigastric pain and new-onset edema.   Patient desires to go home.     O:    T(C): 36.8 (21 @ 06:15), Max: 37.1 (21 @ 16:59)  HR: 74 (21 @ 06:15) (74 - 86)  BP: 147/83 (21 @ 06:15)   RR: 18 (21 @ 06:15) (18 - 19)  SpO2: 98% (21 @ 06:15) (95% - 100%)    Gen: NAD, AOx3  CV: RRR  Pulm: CTAB  Breast: Nontender, not engorged   Abdomen:  Soft, non-tender, non-distended, +bowel sounds.  Uterus:  Fundus firm below umbilicus  Incision:  Clean/dry/intact with steri-strips in place  VE:  +Lochia  Ext:  Non-tender, non-edematous         Mg     6.7               
Patient continues with labile blood pressures and asymptomatic, however frustrated with clinical course.   Will give Labetalol 20 mg IVP now.  Already given additional dose of Procardia 30mg for a total of 60mg daily.   May consider need to add Labetalol 200mg BID for blood pressure control.  Otherwise, tolerating a regular diet, voiding and breast feeding.     Saucedo [id number]  D/w Dr. Puente
FLO KINNEY is a 40y  now POD#1 s/p primary  section secondary to maureen breech presentation at 39 weeks gestation significant for PECwSF on MgSO4.     S:    The patient has no complaints.   Pain is controlled with current treatment regimen.   She has not yet ambulated.  She is tolerating PO.   -flatus/-BM  Clark in place.   She endorses appropriate lochia, which is decreasing.   She is breastfeeding without difficulty.   Denies headaches, visual disturbances, RUQ pain, epigastric pain and new-onset edema. Denies drowsiness, somnolence and SOB.   Patient denies lightheadedness, dizziness, palpitations, shortness of breath and chest pain.     O:    T(C): 36.6 (21 @ 05:00), Max: 37.1 (21 @ 20:15)  HR: 70 (21 @ 05:00) (61 - 88)  BP: 120/74 (21 @ 05:00)   RR: 18 (21 @ 05:00) (14 - 20)  SpO2: 98% (21 @ 05:00) (97% - 100%)      Gen: NAD, AOx3  CV: RRR  Pulm: CTAB  Breast: Nontender, not engorged   Abdomen:  Soft, non-tender, non-distended, +bowel sounds.  Uterus:  Fundus firm below umbilicus  Incision:  Clean/dry/intact with steri-strips/staples in place  VE:  +Lochia  Ext:  Non-tender, non-edematous, 2+ DTRs in UE bilaterally     UOP: 75cc/hr                          13.6   8.77  )-----------( 200      ( 2021 10:44 )             39.2         136  |  102  |  10.0  ----------------------------<  81  3.8   |  20.0<L>  |  0.51    Ca    9.0      2021 12:30  Mg     5.5         TPro  7.0  /  Alb  4.0  /  TBili  0.3<L>  /  DBili  x   /  AST  15  /  ALT  8   /  AlkPhos  166<H>

## 2021-02-01 ENCOUNTER — NON-APPOINTMENT (OUTPATIENT)
Age: 41
End: 2021-02-01

## 2021-02-01 PROCEDURE — 59025 FETAL NON-STRESS TEST: CPT

## 2021-02-01 PROCEDURE — 85610 PROTHROMBIN TIME: CPT

## 2021-02-01 PROCEDURE — 86901 BLOOD TYPING SEROLOGIC RH(D): CPT

## 2021-02-01 PROCEDURE — 36415 COLL VENOUS BLD VENIPUNCTURE: CPT

## 2021-02-01 PROCEDURE — U0005: CPT

## 2021-02-01 PROCEDURE — 85025 COMPLETE CBC W/AUTO DIFF WBC: CPT

## 2021-02-01 PROCEDURE — 86850 RBC ANTIBODY SCREEN: CPT

## 2021-02-01 PROCEDURE — 76815 OB US LIMITED FETUS(S): CPT

## 2021-02-01 PROCEDURE — G0463: CPT

## 2021-02-01 PROCEDURE — 59050 FETAL MONITOR W/REPORT: CPT

## 2021-02-01 PROCEDURE — 80053 COMPREHEN METABOLIC PANEL: CPT

## 2021-02-01 PROCEDURE — 85730 THROMBOPLASTIN TIME PARTIAL: CPT

## 2021-02-01 PROCEDURE — 86900 BLOOD TYPING SEROLOGIC ABO: CPT

## 2021-02-01 PROCEDURE — U0003: CPT

## 2021-02-01 PROCEDURE — 83735 ASSAY OF MAGNESIUM: CPT

## 2021-02-01 PROCEDURE — 88307 TISSUE EXAM BY PATHOLOGIST: CPT

## 2021-02-01 PROCEDURE — 83615 LACTATE (LD) (LDH) ENZYME: CPT

## 2021-02-01 PROCEDURE — 84550 ASSAY OF BLOOD/URIC ACID: CPT

## 2021-02-01 PROCEDURE — 85384 FIBRINOGEN ACTIVITY: CPT

## 2021-02-01 RX ORDER — LABETALOL HCL 100 MG
1 TABLET ORAL
Qty: 90 | Refills: 0
Start: 2021-02-01 | End: 2021-03-02

## 2021-02-01 RX ADMIN — Medication 600 MILLIGRAM(S): at 00:36

## 2021-02-01 NOTE — CHART NOTE - NSCHARTNOTESELECT_GEN_ALL_CORE
OB Provider/Event Note
mag note
ob
BP
Elevated blood pressure/Event Note
Event Note
Mag check/Event Note
OB/Event Note

## 2021-02-01 NOTE — CHART NOTE - NSCHARTNOTEFT_GEN_A_CORE
Pt and  wish to be discharged home prior to suggested discharge of the following morning, due to incoming weather event.  R/B/A of early discharge discussed, including risk of maternal morbidity and mortality secondary to delivery Hx significant for preeclampsia with severe features and a Primary Low Transverse  Section due to breech presentation.  Hx also significant for Uterine Fibroids in pregnancy.  She had pain control issues postoperatively, which exacerbated her hypertension issue.  Pain management was adjusted and explained in detail with both the Pt and her , including the use of antiinflammatory medication around the clock with opioid PRN.  She will continue her current antihypertensive regiment and has been instructed on titration and blood pressure measurement protocol.  She was also started on an antibiotic for endometritis prophylaxis, due to her sensitivity on abdominal examination (no fever, chills, or other signs of endometritis).  Sensitivity on examination may also be due to the presence of uterine fibroids and the release of prostaglandins s/p delivery.  Pt and  were given an emergency contact number and directed on call, follow up and ER precautions.  They were given the opportunity to ask questions and all were addressed prior to discharge.  They are to be discharged home at this time, under these strict precautions.

## 2021-02-03 LAB — SURGICAL PATHOLOGY STUDY: SIGNIFICANT CHANGE UP

## 2021-02-05 ENCOUNTER — APPOINTMENT (OUTPATIENT)
Dept: OBGYN | Facility: CLINIC | Age: 41
End: 2021-02-05
Payer: COMMERCIAL

## 2021-02-05 ENCOUNTER — NON-APPOINTMENT (OUTPATIENT)
Age: 41
End: 2021-02-05

## 2021-02-05 VITALS
BODY MASS INDEX: 27.14 KG/M2 | DIASTOLIC BLOOD PRESSURE: 77 MMHG | HEIGHT: 64 IN | HEART RATE: 75 BPM | SYSTOLIC BLOOD PRESSURE: 134 MMHG | WEIGHT: 159 LBS

## 2021-02-05 PROCEDURE — 99024 POSTOP FOLLOW-UP VISIT: CPT

## 2021-02-05 NOTE — HISTORY OF PRESENT ILLNESS
[FreeTextEntry1] : 40 year old female presenting for her postoperative appointment and blood pressure check.  She delivered via PLTCS 1/28/2020 due to breech presentation and Preeclampsia with severe features.  She delivered a viable female infant weighing 3450g with APGARs 9 & 9.  She was discharged on Labetalol 200MG Q8 and Procardia XL 60 Q24.  Her BPs at home have been 120-140s/80-90s with one outlier this AM.  She is complaining of pain with movement and is almost out of her Opioid medication.  Denies S/S of infection.  She has not removed her SteriStrips.

## 2021-02-05 NOTE — PHYSICAL EXAM
[Soft] : soft [Non-tender] : non-tender [FreeTextEntry7] : Wound is C/D/I with no inflammation or infection.  SteriStrips removed

## 2021-02-05 NOTE — PLAN
[FreeTextEntry1] : 1)  Section wound healing well. SteriStrips removed and Pt was counseled on wound care.\par 2) Discontinue Procardia XL tomorrow and continue Labetalol 200 MG Q8 with call and ER precautions\par 3) Pt counseled on Pain management regiment and is to take Motrin 600 MG Q6 and discontinue her Opioid medication at this time, as she is past the acute pain management stage of her recovery.  This will also help with her abdominal bloating.  She was also counseled on MiraLax.\par \par She will return to office in one week for BP measurement and management.  Plan is for titration of Labetalol.  She has been given strict call, FU, and ER precautions secondary to Preeclampsia with Severe Qualifiers.  All questions have been addressed.

## 2021-02-11 ENCOUNTER — NON-APPOINTMENT (OUTPATIENT)
Age: 41
End: 2021-02-11

## 2021-02-12 ENCOUNTER — APPOINTMENT (OUTPATIENT)
Dept: OBGYN | Facility: CLINIC | Age: 41
End: 2021-02-12
Payer: COMMERCIAL

## 2021-02-12 VITALS
SYSTOLIC BLOOD PRESSURE: 130 MMHG | WEIGHT: 159 LBS | DIASTOLIC BLOOD PRESSURE: 70 MMHG | HEIGHT: 64 IN | BODY MASS INDEX: 27.14 KG/M2

## 2021-02-12 PROCEDURE — 0503F POSTPARTUM CARE VISIT: CPT

## 2021-02-12 PROCEDURE — 99213 OFFICE O/P EST LOW 20 MIN: CPT | Mod: 24

## 2021-02-13 NOTE — HISTORY OF PRESENT ILLNESS
[FreeTextEntry1] : 40 year old female presenting for a blood pressure check.  She delivered via PLTCS 1/28/2020 due to breech presentation and Preeclampsia with severe features.  Her BP medication was titrated to Labetalol 200 MG Q8 one week ago and she is doing well and reports no home BP over 150s/100s.  Denies S/S of postpartum Preeclampsia.  Her pain has also improved and she is moving with more ease and appears happy.

## 2021-02-13 NOTE — PLAN
[FreeTextEntry1] : Reviewed significance of the diagnoses of Preeclampsia with Severe features and its associated morbidity and mortality.  Rx to be titrated to Labetalol 200 MG Q12 for one week, then 100 MG Q12 for the following week, then 50 MG Q12 the third week with discontinuation following.  BP parameters for titration reviewed in detail and she was given a contact number for our office.  She has been given strict Call, Follow up and ER instruction and precautions regarding S/S of elevated BP and Preeclampsia.  She was given the opportunity to ask questions and all were addressed.  She understands the plan of care.

## 2021-02-22 ENCOUNTER — NON-APPOINTMENT (OUTPATIENT)
Age: 41
End: 2021-02-22

## 2021-03-17 ENCOUNTER — APPOINTMENT (OUTPATIENT)
Dept: OBGYN | Facility: CLINIC | Age: 41
End: 2021-03-17
Payer: COMMERCIAL

## 2021-03-17 VITALS
WEIGHT: 157 LBS | BODY MASS INDEX: 26.8 KG/M2 | DIASTOLIC BLOOD PRESSURE: 70 MMHG | HEIGHT: 64 IN | SYSTOLIC BLOOD PRESSURE: 120 MMHG

## 2021-03-17 PROCEDURE — 0503F POSTPARTUM CARE VISIT: CPT

## 2021-03-17 NOTE — HISTORY OF PRESENT ILLNESS
[Postpartum Follow Up] : postpartum follow up [Delivery Date: ___] : on [unfilled] [Primary C/S] : delivered by  section [Female] : Delivery History: baby girl [Breastfeeding] : currently nursing [S/Sx PP Depression] : no signs/symptoms of postpartum depression [None] : No associated symptoms are reported [Clean/Dry/Intact] : clean, dry and intact [Back to Normal] : is back to normal in size [Doing Well] : is doing well [de-identified] : for breech and severe pih; pt required pp labetalol but is now off [de-identified] : declines birth control

## 2021-06-11 ENCOUNTER — APPOINTMENT (OUTPATIENT)
Dept: OBGYN | Facility: CLINIC | Age: 41
End: 2021-06-11
Payer: COMMERCIAL

## 2021-06-11 PROCEDURE — 99072 ADDL SUPL MATRL&STAF TM PHE: CPT

## 2021-06-11 PROCEDURE — 99395 PREV VISIT EST AGE 18-39: CPT

## 2021-06-11 NOTE — HISTORY OF PRESENT ILLNESS
[FreeTextEntry1] : 40-year-old  female para one here for annual visit. Patient is status post primary  section for breech presentation and severe Pih on one . She is intermittently nursing. She has started to menstruate. Does not wish for birth control.\par \par She has a medical history of asthama and hepatitis in the past. Surgical history one . She is . 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

## 2021-06-11 NOTE — DISCUSSION/SUMMARY
[FreeTextEntry1] : Pap smear is performed. As mentioned patient is continuing her prenatal vitamins. She is hoping to get pregnant again in the near future. We have previously discussed advancing age and declining fertility. I again discussed greater risk of developing pregnancy-induced hypertension with this pregnancy given her history.

## 2021-06-14 LAB — HPV HIGH+LOW RISK DNA PNL CVX: NOT DETECTED

## 2021-06-18 LAB — CYTOLOGY CVX/VAG DOC THIN PREP: NORMAL

## 2022-01-31 ENCOUNTER — APPOINTMENT (OUTPATIENT)
Dept: OBGYN | Facility: CLINIC | Age: 42
End: 2022-01-31
Payer: COMMERCIAL

## 2022-01-31 VITALS
SYSTOLIC BLOOD PRESSURE: 120 MMHG | WEIGHT: 164 LBS | HEIGHT: 64 IN | BODY MASS INDEX: 28 KG/M2 | DIASTOLIC BLOOD PRESSURE: 80 MMHG

## 2022-01-31 PROCEDURE — 81025 URINE PREGNANCY TEST: CPT

## 2022-01-31 PROCEDURE — 99213 OFFICE O/P EST LOW 20 MIN: CPT

## 2022-02-02 LAB — HCG UR QL: POSITIVE

## 2022-02-07 ENCOUNTER — NON-APPOINTMENT (OUTPATIENT)
Age: 42
End: 2022-02-07

## 2022-02-07 ENCOUNTER — APPOINTMENT (OUTPATIENT)
Dept: OBGYN | Facility: CLINIC | Age: 42
End: 2022-02-07
Payer: COMMERCIAL

## 2022-02-07 VITALS
DIASTOLIC BLOOD PRESSURE: 80 MMHG | BODY MASS INDEX: 27.83 KG/M2 | HEIGHT: 64 IN | SYSTOLIC BLOOD PRESSURE: 130 MMHG | WEIGHT: 163 LBS

## 2022-02-07 PROCEDURE — 0501F PRENATAL FLOW SHEET: CPT

## 2022-02-07 NOTE — PROCEDURE
[Transvaginal OB Sonogram] : Transvaginal OB Sonogram [Intrauterine Pregnancy] : intrauterine pregnancy [Yolk Sac] : yolk sac present [Fetal Heart] : fetal heart present [Date: ___] : Date: [unfilled] [Current GA by Sonogram: ___ (wks)] : Current GA by Sonogram: [unfilled]Uwks [___ day(s)] : [unfilled] days [FreeTextEntry1] : cheryl

## 2022-02-07 NOTE — HISTORY OF PRESENT ILLNESS
[FreeTextEntry1] : 41-year-old  female  cure with positive UCG. Her LMP is 12 1221 giving her an EDC of 9 1822. She is 7weeks and one day. She's currently on prenatal vitamins. She denies any significant nausea or vaginal bleeding. She had one prior full-term  section for breech presentation.

## 2022-02-07 NOTE — DISCUSSION/SUMMARY
[FreeTextEntry1] : I reassured the patient regarding the findings of the sonogram. She will return in one week for first prenatal visit. She is interested in pursuing a repeat  section.

## 2022-02-23 ENCOUNTER — LABORATORY RESULT (OUTPATIENT)
Age: 42
End: 2022-02-23

## 2022-03-01 ENCOUNTER — NON-APPOINTMENT (OUTPATIENT)
Age: 42
End: 2022-03-01

## 2022-03-04 LAB
CLARI ADDITIONAL INFO: NORMAL
CLARI CHROMOSOME 13: NORMAL
CLARI CHROMOSOME 18: NORMAL
CLARI CHROMOSOME 21: NORMAL
CLARI SEX CHROMOSOMES: NORMAL
CLARITEST NIPT: NORMAL
MATERNAL WEIGHT (LBS):: NORMAL
PLEASE INCLUDE GENDER RESULTS ON THIS REPORT:: NORMAL
TYPE OF PREGNANCY:: NORMAL

## 2022-03-07 ENCOUNTER — NON-APPOINTMENT (OUTPATIENT)
Age: 42
End: 2022-03-07

## 2022-03-07 ENCOUNTER — APPOINTMENT (OUTPATIENT)
Dept: OBGYN | Facility: CLINIC | Age: 42
End: 2022-03-07
Payer: COMMERCIAL

## 2022-03-07 VITALS
WEIGHT: 161 LBS | HEIGHT: 64 IN | SYSTOLIC BLOOD PRESSURE: 123 MMHG | DIASTOLIC BLOOD PRESSURE: 81 MMHG | BODY MASS INDEX: 27.49 KG/M2

## 2022-03-07 DIAGNOSIS — Z87.59 PERSONAL HISTORY OF OTHER COMPLICATIONS OF PREGNANCY, CHILDBIRTH AND THE PUERPERIUM: ICD-10-CM

## 2022-03-07 DIAGNOSIS — O35.1XX0 MATERNAL CARE FOR (SUSPECTED) CHROMOSOMAL ABNORMALITY IN FETUS, NOT APPLICABLE OR UNSPECIFIED: ICD-10-CM

## 2022-03-07 DIAGNOSIS — O28.9 UNSPECIFIED ABNORMAL FINDINGS ON ANTENATAL SCREENING OF MOTHER: ICD-10-CM

## 2022-03-07 PROCEDURE — 0502F SUBSEQUENT PRENATAL CARE: CPT

## 2022-03-07 PROCEDURE — 76817 TRANSVAGINAL US OBSTETRIC: CPT

## 2022-03-09 ENCOUNTER — APPOINTMENT (OUTPATIENT)
Dept: ANTEPARTUM | Facility: CLINIC | Age: 42
End: 2022-03-09
Payer: COMMERCIAL

## 2022-03-09 ENCOUNTER — NON-APPOINTMENT (OUTPATIENT)
Age: 42
End: 2022-03-09

## 2022-03-09 ENCOUNTER — APPOINTMENT (OUTPATIENT)
Dept: MATERNAL FETAL MEDICINE | Facility: CLINIC | Age: 42
End: 2022-03-09
Payer: COMMERCIAL

## 2022-03-09 ENCOUNTER — ASOB RESULT (OUTPATIENT)
Age: 42
End: 2022-03-09

## 2022-03-09 VITALS
SYSTOLIC BLOOD PRESSURE: 138 MMHG | HEIGHT: 61 IN | WEIGHT: 160 LBS | RESPIRATION RATE: 16 BRPM | BODY MASS INDEX: 30.21 KG/M2 | DIASTOLIC BLOOD PRESSURE: 74 MMHG | HEART RATE: 82 BPM | OXYGEN SATURATION: 98 %

## 2022-03-09 DIAGNOSIS — Z09 ENCOUNTER FOR FOLLOW-UP EXAMINATION AFTER COMPLETED TREATMENT FOR CONDITIONS OTHER THAN MALIGNANT NEOPLASM: ICD-10-CM

## 2022-03-09 DIAGNOSIS — Z3A.15 15 WEEKS GESTATION OF PREGNANCY: ICD-10-CM

## 2022-03-09 DIAGNOSIS — R76.8 OTHER SPECIFIED ABNORMAL IMMUNOLOGICAL FINDINGS IN SERUM: ICD-10-CM

## 2022-03-09 DIAGNOSIS — O34.12 MATERNAL CARE FOR BENIGN TUMOR OF CORPUS UTERI, SECOND TRIMESTER: ICD-10-CM

## 2022-03-09 DIAGNOSIS — D25.9 MATERNAL CARE FOR BENIGN TUMOR OF CORPUS UTERI, SECOND TRIMESTER: ICD-10-CM

## 2022-03-09 DIAGNOSIS — Z23 ENCOUNTER FOR IMMUNIZATION: ICD-10-CM

## 2022-03-09 PROCEDURE — 76813 OB US NUCHAL MEAS 1 GEST: CPT

## 2022-03-09 PROCEDURE — 99215 OFFICE O/P EST HI 40 MIN: CPT

## 2022-03-09 PROCEDURE — 76801 OB US < 14 WKS SINGLE FETUS: CPT | Mod: 59

## 2022-03-09 RX ORDER — LABETALOL HYDROCHLORIDE 200 MG/1
200 TABLET, FILM COATED ORAL 3 TIMES DAILY
Refills: 0 | Status: DISCONTINUED | COMMUNITY
Start: 2021-02-01 | End: 2022-03-09

## 2022-03-09 RX ORDER — NIFEDIPINE 60 MG/1
60 TABLET, EXTENDED RELEASE ORAL DAILY
Refills: 0 | Status: DISCONTINUED | COMMUNITY
Start: 2021-02-01 | End: 2022-03-09

## 2022-03-09 RX ORDER — IBUPROFEN 600 MG/1
600 TABLET, FILM COATED ORAL
Refills: 0 | Status: DISCONTINUED | COMMUNITY
Start: 2021-02-01 | End: 2022-03-09

## 2022-03-09 NOTE — PAST MEDICAL HISTORY
[HIV Infection] : no HIV [Exposure To Gonorrhea] : no gonorrhea [Chlamydial Infections] : no chlamydia [Syphilis] : no syphilis [Herpes Simplex] : no genital herpes [Human Papilloma Virus Infection] : no genital warts [Hepatitis, C Virus] : no Hepatitis C [Trichomoniasis] : no trichomoniasis

## 2022-03-09 NOTE — OB HISTORY
[Pregnancy History] : patient received anesthesia [___] : at [unfilled] weeks GA [Spontaneous] : Spontaneous conception [FreeTextEntry1] : First prenatal visit was February 7, 2022.\par \par She has a history of having preeclampsia in a prior pregnancy and started taking a daily baby aspirin on 3/7/22. \par

## 2022-03-09 NOTE — DISCUSSION/SUMMARY
[FreeTextEntry1] : She is 12 weeks and 3 days by her last menstrual period dates.\par \par She had an office ultrasound examination on 3/7/22 that was suspicious for a conjoined twin pregnancy.  I discussed today's ultrasound findings of a single amniotic cavity with 2 fetuses facing each other and joined at the chest and abdomen.  There appears to be one joined heart.  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  I reviewed the ultrasound images with her and her .  I told them that conjoined twins are monozygotic twins in which an incomplete embryonic division occurs 13 to 15 days after conception.  The estimated frequency of conjoined twins is 1.5 in 100,000 births. The thoracopagus type accounts for 75% of conjoined twins. I told them that the prognosis for surgical division was poor due to the shared heart.  The management of the pregnancy was discussed with her and her .  They were informed that they can continue with the pregnancy (expectant management) or terminate the pregnancy.  The delivery method for expectant management is a  section through a classical incision to minimize maternal and fetal trauma.  Vaginal delivery is reasonable in cases of extreme prematurity when fetal survival is not an issue.  They elected to terminate the pregnancy.  I gave them a referral to have the pregnancy termination done as soon as possible.  All questions were answered. \par \par

## 2022-03-10 ENCOUNTER — APPOINTMENT (OUTPATIENT)
Dept: MATERNAL FETAL MEDICINE | Facility: CLINIC | Age: 42
End: 2022-03-10
Payer: COMMERCIAL

## 2022-03-10 PROCEDURE — 99212 OFFICE O/P EST SF 10 MIN: CPT | Mod: 57,95

## 2022-03-10 NOTE — HISTORY OF PRESENT ILLNESS
[FreeTextEntry1] : 42 yo P1 (hx of c-secx1) at 12 wks 4d by EDC 22 presenting for surgical termination counseling for diagnosis of conjoined twins.\par \par All: NKDA\par Meds: ASA 81\par Obhx: hx of PEC at term in previous pregnancy, scheduled primary  for breech\par gynhx: denies\par Pmh/PSH: as above\par SH: former 1/2 ppd tobacco smoker, stopped when found out she was pregnant\par \par \par COUNSELING\par Risks of Suction DC including:\par 1.	Infection: Patient was counseled on risk of infection and the use of prophylactic antibiotics, signs/symptoms of pre- and post-operative infection were reviewed. \par 2.	Hemorrhage: Patient was counseled on the risk of hemorrhage, possibly requiring blood (and/or blood products) transfusion, management including use of but not limited to uterotonic medications.\par 3.	Perforation:  Patient was counseled on the risk of uterine perforation with/without need for laparoscopy/laparotomy with/without injury to adjacent organs such as bowel/bladder. \par 4. Risk of retained products of conception  with/without need for medication or suction procedure to empty the uterus. \par \par \par Need for cervical ripening with mifepristone discussed; We discussed that the intention is to remove the pregnancy by suction, however, will prepare to dilate the cervix to accommodate the larger anticipated size of the pregnancy given conjoined twins. Risks of vaginal spotting reviewed.\par \par The patient also understands it is their responsibility to bring to the attention of their physician any unusual symptoms following the  and to report to follow-up examinations.  \par \par They are sure of their decision and deny any coercion from family, friends or healthcare providers. The patient had the opportunity to ask questions and all questions were answered.  \par \par Pt and partner are interested in conceiving again. We discussed evidence in favor of shorter wait interval leads to shorter time to live birth. Advised to continue pnv.\par \par

## 2022-03-10 NOTE — PLAN
[FreeTextEntry1] : 42 yo P1 for suction dc for conjoined twins, scheduled for 2/15 at 13w2d.\par \par \par 1. Suction DC\par -All available records and ultrasounds have been reviewed \par - Plan for suction dc, may need to use instruments given conjoined twins\par -All consents reviewed today, all questions/concerns addressed\par - Genetics - will do benefits inquiry\par \par 2. Surgery scheduling\par - Patient to be precertified for D+C\par - D+C scheduled for 2/15 at [facility]\par -PSTs, COVID testing scheduled and reviewed\par \par 3. ID/Cervical prep\par - GC/CT - not indicated\par - doxycycline 200 mg in OR\par - Reviewed Ibuprofen 600 mg po q 6 hours \par - mifepristone 200mg for cervical prep.\par \par 4. Labs/Blood type\par - to get CBC, T+S, at PST’s\par - Rhogam pending results\par \par 5. Contraception/conception\par -Patient desires pregnancy\par - continue pnv\par \par 6. Post-op\par - Post-operative telehealth or in person visit optional- to be scheduled in 2 weeks\par - Post-operative instruction sheet reviewed will give at in person visit, reviewed bleeding and infection precautions\par - All questions/concerns of patient addressed to their satisfaction\par

## 2022-03-11 ENCOUNTER — OUTPATIENT (OUTPATIENT)
Dept: OUTPATIENT SERVICES | Facility: HOSPITAL | Age: 42
LOS: 1 days | End: 2022-03-11
Payer: COMMERCIAL

## 2022-03-11 VITALS
HEIGHT: 62 IN | OXYGEN SATURATION: 99 % | WEIGHT: 160.94 LBS | DIASTOLIC BLOOD PRESSURE: 60 MMHG | SYSTOLIC BLOOD PRESSURE: 108 MMHG | TEMPERATURE: 98 F | HEART RATE: 78 BPM | RESPIRATION RATE: 20 BRPM

## 2022-03-11 DIAGNOSIS — Z29.9 ENCOUNTER FOR PROPHYLACTIC MEASURES, UNSPECIFIED: ICD-10-CM

## 2022-03-11 DIAGNOSIS — Z91.89 OTHER SPECIFIED PERSONAL RISK FACTORS, NOT ELSEWHERE CLASSIFIED: ICD-10-CM

## 2022-03-11 DIAGNOSIS — Z98.890 OTHER SPECIFIED POSTPROCEDURAL STATES: Chronic | ICD-10-CM

## 2022-03-11 DIAGNOSIS — O30.029: ICD-10-CM

## 2022-03-11 DIAGNOSIS — Z98.891 HISTORY OF UTERINE SCAR FROM PREVIOUS SURGERY: Chronic | ICD-10-CM

## 2022-03-11 DIAGNOSIS — Z01.818 ENCOUNTER FOR OTHER PREPROCEDURAL EXAMINATION: ICD-10-CM

## 2022-03-11 LAB
ANION GAP SERPL CALC-SCNC: 14 MMOL/L — SIGNIFICANT CHANGE UP (ref 5–17)
BASOPHILS # BLD AUTO: 0.03 K/UL — SIGNIFICANT CHANGE UP (ref 0–0.2)
BASOPHILS NFR BLD AUTO: 0.3 % — SIGNIFICANT CHANGE UP (ref 0–2)
BLD GP AB SCN SERPL QL: SIGNIFICANT CHANGE UP
BUN SERPL-MCNC: 8.9 MG/DL — SIGNIFICANT CHANGE UP (ref 8–20)
CALCIUM SERPL-MCNC: 8.9 MG/DL — SIGNIFICANT CHANGE UP (ref 8.6–10.2)
CHLORIDE SERPL-SCNC: 100 MMOL/L — SIGNIFICANT CHANGE UP (ref 98–107)
CO2 SERPL-SCNC: 22 MMOL/L — SIGNIFICANT CHANGE UP (ref 22–29)
COMMENT - BLOOD BANK: SIGNIFICANT CHANGE UP
CREAT SERPL-MCNC: 0.56 MG/DL — SIGNIFICANT CHANGE UP (ref 0.5–1.3)
EGFR: 119 ML/MIN/1.73M2 — SIGNIFICANT CHANGE UP
EOSINOPHIL # BLD AUTO: 0.12 K/UL — SIGNIFICANT CHANGE UP (ref 0–0.5)
EOSINOPHIL NFR BLD AUTO: 1.1 % — SIGNIFICANT CHANGE UP (ref 0–6)
GLUCOSE SERPL-MCNC: 90 MG/DL — SIGNIFICANT CHANGE UP (ref 70–99)
HCT VFR BLD CALC: 35.1 % — SIGNIFICANT CHANGE UP (ref 34.5–45)
HGB BLD-MCNC: 11.7 G/DL — SIGNIFICANT CHANGE UP (ref 11.5–15.5)
IMM GRANULOCYTES NFR BLD AUTO: 0.5 % — SIGNIFICANT CHANGE UP (ref 0–1.5)
LYMPHOCYTES # BLD AUTO: 2.34 K/UL — SIGNIFICANT CHANGE UP (ref 1–3.3)
LYMPHOCYTES # BLD AUTO: 21.7 % — SIGNIFICANT CHANGE UP (ref 13–44)
MCHC RBC-ENTMCNC: 27.9 PG — SIGNIFICANT CHANGE UP (ref 27–34)
MCHC RBC-ENTMCNC: 33.3 GM/DL — SIGNIFICANT CHANGE UP (ref 32–36)
MCV RBC AUTO: 83.8 FL — SIGNIFICANT CHANGE UP (ref 80–100)
MONOCYTES # BLD AUTO: 0.84 K/UL — SIGNIFICANT CHANGE UP (ref 0–0.9)
MONOCYTES NFR BLD AUTO: 7.8 % — SIGNIFICANT CHANGE UP (ref 2–14)
NEUTROPHILS # BLD AUTO: 7.42 K/UL — HIGH (ref 1.8–7.4)
NEUTROPHILS NFR BLD AUTO: 68.6 % — SIGNIFICANT CHANGE UP (ref 43–77)
PLATELET # BLD AUTO: 260 K/UL — SIGNIFICANT CHANGE UP (ref 150–400)
POTASSIUM SERPL-MCNC: 3.8 MMOL/L — SIGNIFICANT CHANGE UP (ref 3.5–5.3)
POTASSIUM SERPL-SCNC: 3.8 MMOL/L — SIGNIFICANT CHANGE UP (ref 3.5–5.3)
RBC # BLD: 4.19 M/UL — SIGNIFICANT CHANGE UP (ref 3.8–5.2)
RBC # FLD: 12.2 % — SIGNIFICANT CHANGE UP (ref 10.3–14.5)
SARS-COV-2 RNA SPEC QL NAA+PROBE: SIGNIFICANT CHANGE UP
SODIUM SERPL-SCNC: 136 MMOL/L — SIGNIFICANT CHANGE UP (ref 135–145)
WBC # BLD: 10.8 K/UL — HIGH (ref 3.8–10.5)
WBC # FLD AUTO: 10.8 K/UL — HIGH (ref 3.8–10.5)

## 2022-03-11 PROCEDURE — G0463: CPT

## 2022-03-11 PROCEDURE — 93010 ELECTROCARDIOGRAM REPORT: CPT

## 2022-03-11 PROCEDURE — 93005 ELECTROCARDIOGRAM TRACING: CPT

## 2022-03-11 RX ORDER — SODIUM CHLORIDE 9 MG/ML
3 INJECTION INTRAMUSCULAR; INTRAVENOUS; SUBCUTANEOUS ONCE
Refills: 0 | Status: DISCONTINUED | OUTPATIENT
Start: 2022-03-15 | End: 2022-03-15

## 2022-03-11 NOTE — H&P PST ADULT - NSICDXPASTMEDICALHX_GEN_ALL_CORE_FT
PAST MEDICAL HISTORY:  2019 novel coronavirus disease (COVID-19) 7/2021    Asthma     Conjoined twin pregnancy, unspecified trimester     Hypertension      PAST MEDICAL HISTORY:  2019 novel coronavirus disease (COVID-19) 7/2021    Asthma     Conjoined twin pregnancy, unspecified trimester     Hypertension     Tobacco use disorder

## 2022-03-11 NOTE — H&P PST ADULT - OPHTHALMOLOGIC COMMENTS
-- DO NOT REPLY / DO NOT REPLY ALL --  -- Message is from the [facility]--    Provider paged via [employer] Documentation - The below message was copied and pasted from a [employer] page:    Initiated Date/Time 8/15/2021 10:45 am   Message Sent Date/Time 8/15/2021 10:47 am   Source [facility] Contact Center   Department [facility]   Method Secure Text   Contacted Pamela Perez DO   Requested Tru Adame DO   Details Patient   Message   [phone] ACC URGENT CALLER NAME: VENKATA PAT PHYSICIAN: TRU ADAME RE: VENKATA DIAZKI PATIENT 1963 PATIENT PCP: WENDI JAMES PATIENT MISSED THE DOCTOR'S CALL, PLEASE FOLLOW UP REGARDING THIS MATTER. CHFO*       
glasses

## 2022-03-11 NOTE — H&P PST ADULT - LAB RESULTS AND INTERPRETATION
labs pending labs pending  3/11/22 17:58 All available labs noted as documented, all abnormal labs noted as documented and have been faxed to PCP. Lab results also emailed to Dr. Neha James MS, [facility]

## 2022-03-11 NOTE — H&P PST ADULT - HISTORY OF PRESENT ILLNESS
42 y/o female presents today to PST pending US guided dilation and curettage with suction 1st trimester on 3/15/22 with Dr. Hayde Pruitt secondary to conjoined twin pregnancy. P1 C sec x 1. Pt. with history of COVID 7/2021, pt. HTN, asthma.  Pt. states in past week she had cold like symptoms which self-resolved with chloraseptic medication OTC, pt. states she had runny nose and non-productive cough. Symptoms of which have now resolved. As per chart pt. is 12 weeks 4 days with a EDC 9/18/22 who is undergoing termination of pregnancy due to conjoined twins.  42 y/o female presents today to PST pending US guided dilation and curettage with suction 1st trimester on 3/15/22 with Dr. Hayde Pruitt secondary to conjoined twin pregnancy. P1 C sec x 1. Pt. with history of COVID 7/2021, pt. with reported history of HTN, asthma.  Pt. states in past week she had cold like symptoms which self-resolved with chloraseptic medication OTC, pt. states she had runny nose and non-productive cough. Symptoms of which have now resolved. As per chart pt. is 12 weeks 4 days with a EDC 9/18/22 who is undergoing termination of pregnancy due to conjoined twins. Pt. states history of asthma, no presently prescribed inhalers, states she had asthma in childhood, denies symptoms of asthma, SOB, cough or recent hospitalizations for asthma. Pt. with history of HTN, denies present medications, or any associated symptoms of HA, dizziness, SOB, CP, lightheadedness, or blurred vision.

## 2022-03-11 NOTE — H&P PST ADULT - PROBLEM SELECTOR PLAN 2
US guided dilation and curettage with suction 1st trimester on 3/15/22 with Dr. Hayde Pruitt secondary to conjoined twin pregnancy.

## 2022-03-11 NOTE — H&P PST ADULT - PROBLEM/PLAN-3
PHYSICIAN NEXT STEPS:   Review Only      CHIEF COMPLAINT:   Chief Complaint/Protocol Used: No Contact or Duplicate Contact Call   Onset: N/A         ASSESSMENT:   -------------------------------------------------------      DISPOSITION:   Disposition Recommendation: No Contact Calls   Questions that led to disposition:   â€¢ Message left with person in household.   Patient Directed To: Unspecified   Patient Intended Action: Seek care in the doctor's office          CALL NOTES:   10/13/2017 at 5:11 PM by Valentine Cary   â€¢ Patient is currently not with caller (roommate Dottie). Caller states patient asked her to call to make an appointment for leg swelling and numbness. Advised caller to have patient call back when she's available for nurse assessment and triage - verbalizes understanding. Connected caller to [facility] at appointments for scheduling.      DISPOSITION OVERRIDE/PROVIDER CONSULT:   Disposition Override: N/A   Override Source: Unspecified   Consulted with PCP: No   Consulted with On-Call Physician: No         CALLER CONTACT INFO:   Name: CHARITY FERMIN (Self)   Phone 1: [phone] (Home)   Phone 2: [phone] - Preferred         ENCOUNTER STARTED:   10/13/17 05:03:30 PM   ENCOUNTER ASSIGNED TO/CLOSED BY:   Valentine Cary @ 10/13/17 05:11:45 PM         -------------------------------------------------------      UNDERSTANDS CARE ADVICE: Yes      AGREES WITH CARE ADVICE: Yes      WILL FOLLOW CARE ADVICE: Yes      -------------------------------------------------------  
DISPLAY PLAN FREE TEXT

## 2022-03-11 NOTE — H&P PST ADULT - ASSESSMENT
40 y/o female presents today to [facility] pending US guided dilation and curettage with suction 1st trimester on 3/15/22 with Dr. Hayde Pruitt secondary to conjoined twin pregnancy. As per chart- P1 C sec x 1. Pt. with history of COVID 2021, pt. HTN, asthma.  Pt. states in past week she had cold like symptoms which self-resolved with chloraseptic medication OTC, pt. states she had runny nose and non-productive cough, symptoms of which have now resolved, denies fever or chills, SOB, wheezing. As per chart pt. is 12 weeks 4 days with a EDC 22 who is undergoing termination of pregnancy due to conjoined twins.     Patient educated on surgical scrub, COVID testing done in PST today 3/11/22 and pending, preadmission instructions,  and day of procedure medications as per policy, and pt. verbalized understanding and agreement.  Pt instructed to stop vitamins/supplements/herbal medications/NSAIDS for one week prior to surgery and discuss with PMD and pt. verbalized agreement and understanding.  Pt. advised to discuss preoperative aspirin hold with surgeon and GYN and pt. verbalized agreement and understanding.  Pt. educated regarding all preoperative instruction and education via both verbal and written means of communication as per policy and pt. verbalized agreement and understanding.    CAPRINI SCORE    AGE RELATED RISK FACTORS                                                             [ ] Age 41-60 years                                            (1 Point)  [ ] Age: 61-74 years                                           (2 Points)                 [ ] Age= 75 years                                                (3 Points)             DISEASE RELATED RISK FACTORS                                                       [ ] Edema in the lower extremities                 (1 Point)                     [ ] Varicose veins                                               (1 Point)                                 [ x] BMI > 25 Kg/m2                                            (1 Point)                                  [ ] Serious infection (ie PNA)                            (1 Point)                     [ ] Lung disease ( COPD, Emphysema)            (1 Point)                                                                          [ ] Acute myocardial infarction                         (1 Point)                  [ ] Congestive heart failure (in the previous month)  (1 Point)         [ ] Inflammatory bowel disease                            (1 Point)                  [ ] Central venous access, PICC or Port               (2 points)       (within the last month)                                                                [ ] Stroke (in the previous month)                        (5 Points)    [ ] Previous or present malignancy                       (2 points)                                                                                                                                                         HEMATOLOGY RELATED FACTORS                                                         [ ] Prior episodes of VTE                                     (3 Points)                     [ ] Positive family history for VTE                      (3 Points)                  [ ] Prothrombin 08753 A                                     (3 Points)                     [ ] Factor V Leiden                                                (3 Points)                        [ ] Lupus anticoagulants                                      (3 Points)                                                           [ ] Anticardiolipin antibodies                              (3 Points)                                                       [ ] High homocysteine in the blood                   (3 Points)                                             [ ] Other congenital or acquired thrombophilia      (3 Points)                                                [ ] Heparin induced thrombocytopenia                  (3 Points)                                        MOBILITY RELATED FACTORS  [ ] Bed rest                                                         (1 Point)  [ ] Plaster cast                                                    (2 points)  [ ] Bed bound for more than 72 hours           (2 Points)    GENDER SPECIFIC FACTORS  [x ] Pregnancy or had a baby within the last month   (1 Point)  [ ] Post-partum < 6 weeks                                   (1 Point)  [ ] Hormonal therapy  or oral contraception   (1 Point)  [ ] History of pregnancy complications              (1 point)  [ ] Unexplained or recurrent              (1 Point)    OTHER RISK FACTORS                                           (1 Point)  [ ] BMI >40, smoking, diabetes requiring insulin, chemotherapy  blood transfusions and length of surgery over 2 hours    SURGERY RELATED RISK FACTORS  [ ]  Section within the last month     (1 Point)  [ ] Minor surgery                                                  (1 Point)  [ ] Arthroscopic surgery                                       (2 Points)  [x ] Planned major surgery lasting more            (2 Points)      than 45 minutes     [ ] Elective hip or knee joint replacement       (5 points)       surgery                                                TRAUMA RELATED RISK FACTORS  [ ] Fracture of the hip, pelvis, or leg                       (5 Points)  [ ] Spinal cord injury resulting in paralysis             (5 points)       (in the previous month)    [ ] Paralysis  (less than 1 month)                             (5 Points)  [ ] Multiple Trauma within 1 month                        (5 Points)    Total Score [   4     ]    Caprini Score 0-2: Low Risk, NO VTE prophylaxis required for most patients, encourage ambulation  Caprini Score 3-6: Moderate Risk , pharmacologic VTE prophylaxis is indicated for most patients (in the absence of contraindications)  Caprini Score Greater than or =7: High risk, pharmocologic VTE prophylaxis indicated for most patients (in the absence of contraindications)    OPIOID RISK TOOL    YOVANI EACH BOX THAT APPLIES AND ADD TOTALS AT THE END    FAMILY HISTORY OF SUBSTANCE ABUSE                 FEMALE         MALE                                                Alcohol                             [  ]1 pt          [  ]3pts                                               Illegal Durgs                     [  ]2 pts        [  ]3pts                                               Rx Drugs                           [  ]4 pts        [  ]4 pts    PERSONAL HISTORY OF SUBSTANCE ABUSE                                                                                          Alcohol                             [  ]3 pts       [  ]3 pts                                               Illegal Drugs                     [  ]4 pts        [  ]4 pts                                               Rx Drugs                           [  ]5 pts        [  ]5 pts    AGE BETWEEN 16-45 YEARS                                      [  x]1 pt         [  ]1 pt    HISTORY OF PREADOLESCENT   SEXUAL ABUSE                                                             [  ]3 pts        [  ]0pts    PSYCHOLOGICAL DISEASE                     ADD, OCD, Bipolar, Schizophrenia        [  ]2 pts         [  ]2 pts                      Depression                                               [  ]1 pt           [  ]1 pt           SCORING TOTAL   (add numbers and type here)              (1)                                     A score of 3 or lower indicated LOW risk for future opioid abuse  A score of 4 to 7 indicated moderate risk for future opioid abuse  A score of 8 or higher indicates a high risk for opioid abuse                                     42 y/o female presents today to [facility] pending US guided dilation and curettage with suction 1st trimester on 3/15/22 with Dr. Hayde Pruitt secondary to conjoined twin pregnancy. P1 C sec x 1. Pt. with history of COVID 2021, pt. with reported history of HTN, asthma.  Pt. states in past week she had cold like symptoms which self-resolved with chloraseptic medication OTC, pt. states she had runny nose and non-productive cough. Symptoms of which have now resolved. As per chart pt. is 12 weeks 4 days with a EDC 22 who is undergoing termination of pregnancy due to conjoined twins. Pt. states history of asthma, no presently prescribed inhalers, states she had asthma in childhood, denies symptoms of asthma, SOB, cough or recent hospitalizations for asthma. Pt. with history of HTN, denies present medications, or any associated symptoms of HA, dizziness, SOB, CP, lightheadedness, or blurred vision. BP well controlled on exam today.     Patient educated on surgical scrub, COVID testing done in PST today 3/11/22 and pending, preadmission instructions,  and day of procedure medications as per policy, and pt. verbalized understanding and agreement.  Pt instructed to stop vitamins/supplements/herbal medications/NSAIDS for one week prior to surgery and discuss with PMD and pt. verbalized agreement and understanding.  Pt. advised to discuss preoperative aspirin hold with surgeon and GYN and pt. verbalized agreement and understanding.  Pt. educated regarding all preoperative instruction and education via both verbal and written means of communication as per policy and pt. verbalized agreement and understanding.    CAPRINI SCORE    AGE RELATED RISK FACTORS                                                             [ ] Age 41-60 years                                            (1 Point)  [ ] Age: 61-74 years                                           (2 Points)                 [ ] Age= 75 years                                                (3 Points)             DISEASE RELATED RISK FACTORS                                                       [ ] Edema in the lower extremities                 (1 Point)                     [ ] Varicose veins                                               (1 Point)                                 [ x] BMI > 25 Kg/m2                                            (1 Point)                                  [ ] Serious infection (ie PNA)                            (1 Point)                     [ ] Lung disease ( COPD, Emphysema)            (1 Point)                                                                          [ ] Acute myocardial infarction                         (1 Point)                  [ ] Congestive heart failure (in the previous month)  (1 Point)         [ ] Inflammatory bowel disease                            (1 Point)                  [ ] Central venous access, PICC or Port               (2 points)       (within the last month)                                                                [ ] Stroke (in the previous month)                        (5 Points)    [ ] Previous or present malignancy                       (2 points)                                                                                                                                                         HEMATOLOGY RELATED FACTORS                                                         [ ] Prior episodes of VTE                                     (3 Points)                     [ ] Positive family history for VTE                      (3 Points)                  [ ] Prothrombin 75840 A                                     (3 Points)                     [ ] Factor V Leiden                                                (3 Points)                        [ ] Lupus anticoagulants                                      (3 Points)                                                           [ ] Anticardiolipin antibodies                              (3 Points)                                                       [ ] High homocysteine in the blood                   (3 Points)                                             [ ] Other congenital or acquired thrombophilia      (3 Points)                                                [ ] Heparin induced thrombocytopenia                  (3 Points)                                        MOBILITY RELATED FACTORS  [ ] Bed rest                                                         (1 Point)  [ ] Plaster cast                                                    (2 points)  [ ] Bed bound for more than 72 hours           (2 Points)    GENDER SPECIFIC FACTORS  [x ] Pregnancy or had a baby within the last month   (1 Point)  [ ] Post-partum < 6 weeks                                   (1 Point)  [ ] Hormonal therapy  or oral contraception   (1 Point)  [ ] History of pregnancy complications              (1 point)  [ ] Unexplained or recurrent              (1 Point)    OTHER RISK FACTORS                                           (1 Point)  [ ] BMI >40, smoking, diabetes requiring insulin, chemotherapy  blood transfusions and length of surgery over 2 hours    SURGERY RELATED RISK FACTORS  [ ]  Section within the last month     (1 Point)  [ ] Minor surgery                                                  (1 Point)  [ ] Arthroscopic surgery                                       (2 Points)  [x ] Planned major surgery lasting more            (2 Points)      than 45 minutes     [ ] Elective hip or knee joint replacement       (5 points)       surgery                                                TRAUMA RELATED RISK FACTORS  [ ] Fracture of the hip, pelvis, or leg                       (5 Points)  [ ] Spinal cord injury resulting in paralysis             (5 points)       (in the previous month)    [ ] Paralysis  (less than 1 month)                             (5 Points)  [ ] Multiple Trauma within 1 month                        (5 Points)    Total Score [   4     ]    Caprini Score 0-2: Low Risk, NO VTE prophylaxis required for most patients, encourage ambulation  Caprini Score 3-6: Moderate Risk , pharmacologic VTE prophylaxis is indicated for most patients (in the absence of contraindications)  Caprini Score Greater than or =7: High risk, pharmocologic VTE prophylaxis indicated for most patients (in the absence of contraindications)    OPIOID RISK TOOL    YOVANI EACH BOX THAT APPLIES AND ADD TOTALS AT THE END    FAMILY HISTORY OF SUBSTANCE ABUSE                 FEMALE         MALE                                                Alcohol                             [  ]1 pt          [  ]3pts                                               Illegal Durgs                     [  ]2 pts        [  ]3pts                                               Rx Drugs                           [  ]4 pts        [  ]4 pts    PERSONAL HISTORY OF SUBSTANCE ABUSE                                                                                          Alcohol                             [  ]3 pts       [  ]3 pts                                               Illegal Drugs                     [  ]4 pts        [  ]4 pts                                               Rx Drugs                           [  ]5 pts        [  ]5 pts    AGE BETWEEN 16-45 YEARS                                      [  x]1 pt         [  ]1 pt    HISTORY OF PREADOLESCENT   SEXUAL ABUSE                                                             [  ]3 pts        [  ]0pts    PSYCHOLOGICAL DISEASE                     ADD, OCD, Bipolar, Schizophrenia        [  ]2 pts         [  ]2 pts                      Depression                                               [  ]1 pt           [  ]1 pt           SCORING TOTAL   (add numbers and type here)              (1)                                     A score of 3 or lower indicated LOW risk for future opioid abuse  A score of 4 to 7 indicated moderate risk for future opioid abuse  A score of 8 or higher indicates a high risk for opioid abuse

## 2022-03-14 ENCOUNTER — TRANSCRIPTION ENCOUNTER (OUTPATIENT)
Age: 42
End: 2022-03-14

## 2022-03-14 ENCOUNTER — APPOINTMENT (OUTPATIENT)
Dept: MATERNAL FETAL MEDICINE | Facility: CLINIC | Age: 42
End: 2022-03-14
Payer: COMMERCIAL

## 2022-03-14 VITALS — BODY MASS INDEX: 30.21 KG/M2 | WEIGHT: 160 LBS | OXYGEN SATURATION: 99 % | HEART RATE: 80 BPM | HEIGHT: 61 IN

## 2022-03-14 DIAGNOSIS — Z87.09 PERSONAL HISTORY OF OTHER DISEASES OF THE RESPIRATORY SYSTEM: ICD-10-CM

## 2022-03-14 DIAGNOSIS — I10 ESSENTIAL (PRIMARY) HYPERTENSION: ICD-10-CM

## 2022-03-14 DIAGNOSIS — J45.909 UNSPECIFIED ASTHMA, UNCOMPLICATED: ICD-10-CM

## 2022-03-14 PROBLEM — U07.1 COVID-19: Chronic | Status: ACTIVE | Noted: 2022-03-11

## 2022-03-14 PROCEDURE — S0190: CPT

## 2022-03-14 PROCEDURE — 99213 OFFICE O/P EST LOW 20 MIN: CPT

## 2022-03-14 RX ORDER — MIFEPRISTONE 200 MG
200 TABLET ORAL
Refills: 0 | Status: COMPLETED | OUTPATIENT
Start: 2022-03-14

## 2022-03-14 RX ADMIN — Medication 0 MG: at 00:00

## 2022-03-14 RX ADMIN — MIFEPRISTONE 0 MG: 200 TABLET ORAL at 00:00

## 2022-03-14 NOTE — DISCUSSION/SUMMARY
[FreeTextEntry1] : \par 1. Suction DC possible Dilation and Evacuation \par -All available records and ultrasounds have been reviewed \par -All consents signed today, all questions/concerns addressed\par - Patient offered pamphlet for support services - accepted\par - Genetics- reviewed structural issue \par - Reviewed disposal of remains, hospital vs. private burial.  Patient thinking of  home disposition\par \par 2. Surgery scheduling\par - Tomorrow 13w2d\par -PSTs, COVID testing  reviewed\par \par 3. ID/Cervical prep\par - GC/CT- not indicated\par - doxycycline 200 mg in OR\par - Reviewed Ibuprofen 600 mg po q 6 hours \par \par mifepristone Lot# [id number]\par Exp: 2025\par NDC 95916-510-62\par \par 4. Labs/Blood type\par -  O POS\par -H/H 11.7/35\par \par 5. Contraception/ Conception\par -Patient desires pregnancy\par \par 6. Post-op\par - Post-operative telehealth or in person visit optional- to be scheduled in 2 weeks\par - Post-operative instruction sheet given, reviewed bleeding and infection precautions\par - Provided 24 hour contact phone number\par - All questions/concerns of patient addressed to their satisfaction\par

## 2022-03-14 NOTE — HISTORY OF PRESENT ILLNESS
[FreeTextEntry1] : 40 yo  at 13w1d with conjoined twins presenting for cervical prep prior to suction DC tomorrow for termination. See note from 3/10 for full counseling.\par \par Aftercare sheet reviewed. \par Pt may be interested in private burial.\par Support resources given and reviewed.\par Procedure consent signed.\par Mifepristone consents signed.\par Mifepristone administered:\par \par mifepristone Lot# [id number]\par Exp: 2025\par NDC 69904-379-90

## 2022-03-15 ENCOUNTER — RESULT REVIEW (OUTPATIENT)
Age: 42
End: 2022-03-15

## 2022-03-15 ENCOUNTER — OUTPATIENT (OUTPATIENT)
Dept: INPATIENT UNIT | Facility: HOSPITAL | Age: 42
LOS: 1 days | End: 2022-03-15
Payer: COMMERCIAL

## 2022-03-15 VITALS
HEIGHT: 61 IN | DIASTOLIC BLOOD PRESSURE: 73 MMHG | WEIGHT: 165.35 LBS | SYSTOLIC BLOOD PRESSURE: 120 MMHG | OXYGEN SATURATION: 100 % | HEART RATE: 84 BPM | RESPIRATION RATE: 20 BRPM | TEMPERATURE: 99 F

## 2022-03-15 VITALS
HEART RATE: 67 BPM | OXYGEN SATURATION: 100 % | TEMPERATURE: 98 F | SYSTOLIC BLOOD PRESSURE: 130 MMHG | RESPIRATION RATE: 18 BRPM | DIASTOLIC BLOOD PRESSURE: 80 MMHG

## 2022-03-15 DIAGNOSIS — O30.029: ICD-10-CM

## 2022-03-15 DIAGNOSIS — Z98.890 OTHER SPECIFIED POSTPROCEDURAL STATES: Chronic | ICD-10-CM

## 2022-03-15 DIAGNOSIS — Z98.891 HISTORY OF UTERINE SCAR FROM PREVIOUS SURGERY: Chronic | ICD-10-CM

## 2022-03-15 LAB — BLD GP AB SCN SERPL QL: SIGNIFICANT CHANGE UP

## 2022-03-15 PROCEDURE — 86900 BLOOD TYPING SEROLOGIC ABO: CPT

## 2022-03-15 PROCEDURE — 36415 COLL VENOUS BLD VENIPUNCTURE: CPT

## 2022-03-15 PROCEDURE — 88304 TISSUE EXAM BY PATHOLOGIST: CPT

## 2022-03-15 PROCEDURE — 59840 INDUCED ABORTION D&C: CPT | Mod: GC

## 2022-03-15 PROCEDURE — 76998 US GUIDE INTRAOP: CPT | Mod: 26

## 2022-03-15 PROCEDURE — 88304 TISSUE EXAM BY PATHOLOGIST: CPT | Mod: 26

## 2022-03-15 PROCEDURE — 86901 BLOOD TYPING SEROLOGIC RH(D): CPT

## 2022-03-15 PROCEDURE — 59840 INDUCED ABORTION D&C: CPT

## 2022-03-15 PROCEDURE — 86850 RBC ANTIBODY SCREEN: CPT

## 2022-03-15 RX ORDER — ASPIRIN/CALCIUM CARB/MAGNESIUM 324 MG
1 TABLET ORAL
Qty: 0 | Refills: 0 | DISCHARGE

## 2022-03-15 RX ORDER — CELECOXIB 200 MG/1
400 CAPSULE ORAL ONCE
Refills: 0 | Status: COMPLETED | OUTPATIENT
Start: 2022-03-15 | End: 2022-03-15

## 2022-03-15 RX ORDER — ACETAMINOPHEN 500 MG
975 TABLET ORAL ONCE
Refills: 0 | Status: COMPLETED | OUTPATIENT
Start: 2022-03-15 | End: 2022-03-15

## 2022-03-15 RX ADMIN — CELECOXIB 400 MILLIGRAM(S): 200 CAPSULE ORAL at 12:40

## 2022-03-15 RX ADMIN — Medication 975 MILLIGRAM(S): at 12:39

## 2022-03-15 NOTE — ASU DISCHARGE PLAN (ADULT/PEDIATRIC) - CALL YOUR DOCTOR IF YOU HAVE ANY OF THE FOLLOWING:
Fever greater than (need to indicate Fahrenheit or Celsius)/Wound/Surgical Site with redness, or foul smelling discharge or pus/Nausea and vomiting that does not stop Bleeding that does not stop/Pain not relieved by Medications/Fever greater than (need to indicate Fahrenheit or Celsius)/Wound/Surgical Site with redness, or foul smelling discharge or pus/Nausea and vomiting that does not stop/Unable to urinate

## 2022-03-15 NOTE — BRIEF OPERATIVE NOTE - NSICDXBRIEFPROCEDURE_GEN_ALL_CORE_FT
PROCEDURES:  Dilation and curettage, uterus, using suction, with US guidance 15-Mar-2022 15:35:03  Suri Johnson

## 2022-03-15 NOTE — BRIEF OPERATIVE NOTE - ESTIMATED BLOOD LOSS
Called [facility] and spoke to Hanane Anne - she informed me that the order is still in insurance verification  She placed me on hold while she reached out to the insurance team for an update  When she came back on the line she informed me that  they are still working on the order and a STAT request can take up to 48 hours  She stated that she is sending an e-mail to the insurance supervisor to expedite    10

## 2022-03-15 NOTE — ASU DISCHARGE PLAN (ADULT/PEDIATRIC) - NS MD DC FALL RISK RISK
For information on Fall & Injury Prevention, visit: [email or web address] OR  [email or web address] OR  https://www.cdc.gov/steadi/patient.html

## 2022-03-15 NOTE — BRIEF OPERATIVE NOTE - OPERATION/FINDINGS
Retroverted uterus approximately 13 weeks in size. At end of procedure, thin endometrial stripe was noted on sonogram. Excellent hemostasis noted

## 2022-03-15 NOTE — ASU DISCHARGE PLAN (ADULT/PEDIATRIC) - CARE PROVIDER_API CALL
Hayde Pruitt; MPH)  Obstetrics and Gynecology  [address] [place] [address], [facility] [address]  [place] [address]  Phone: [phone]  Fax: [phone]  Follow Up Time: 1 week

## 2022-03-15 NOTE — ASU DISCHARGE PLAN (ADULT/PEDIATRIC) - NURSING INSTRUCTIONS
You were given Tylenol for pain management.  Please DO NOT take tylenol or products that contain tylenol for the next 6-8 hours (until 7:00pm ). Please do not exceed 4000mg in 24hours.

## 2022-03-22 ENCOUNTER — APPOINTMENT (OUTPATIENT)
Dept: MATERNAL FETAL MEDICINE | Facility: CLINIC | Age: 42
End: 2022-03-22
Payer: COMMERCIAL

## 2022-03-22 PROCEDURE — 99024 POSTOP FOLLOW-UP VISIT: CPT

## 2022-03-22 NOTE — HISTORY OF PRESENT ILLNESS
[Pain is well-controlled] : pain is well-controlled [Fever] : no fever [de-identified] : 9 [de-identified] : suction dc [de-identified] : conjoined twins [de-identified] : [employer] televisit\[place] \par Pt doing well. Reports 24 hours of cramping after procedure. No pain now, scant vaginal spotting. Aware this may last another week or more and that is normal. Pt and partner interested in conceiving again. Has strong family support. [de-identified] : no abdominal pain when patient pushes on lower abdomen

## 2022-03-22 NOTE — ASSESSMENT
[Doing Well] : is doing well [Excellent Pain Control] : has excellent pain control [de-identified] : 40 yo s/p sucution DC for conjoined twins at 13 weeks doing well

## 2022-03-24 LAB — SURGICAL PATHOLOGY STUDY: SIGNIFICANT CHANGE UP

## 2022-04-04 ENCOUNTER — APPOINTMENT (OUTPATIENT)
Dept: MATERNAL FETAL MEDICINE | Facility: CLINIC | Age: 42
End: 2022-04-04

## 2023-01-04 ENCOUNTER — APPOINTMENT (OUTPATIENT)
Dept: OBGYN | Facility: CLINIC | Age: 43
End: 2023-01-04
Payer: COMMERCIAL

## 2023-01-04 ENCOUNTER — RESULT CHARGE (OUTPATIENT)
Age: 43
End: 2023-01-04

## 2023-01-04 VITALS
HEIGHT: 61 IN | WEIGHT: 145 LBS | SYSTOLIC BLOOD PRESSURE: 133 MMHG | BODY MASS INDEX: 27.38 KG/M2 | DIASTOLIC BLOOD PRESSURE: 83 MMHG

## 2023-01-04 PROCEDURE — 81025 URINE PREGNANCY TEST: CPT

## 2023-01-04 PROCEDURE — 99213 OFFICE O/P EST LOW 20 MIN: CPT

## 2023-01-05 LAB
HCG SERPL-MCNC: 366 MIU/ML
HCG UR QL: POSITIVE
PROGEST SERPL-MCNC: 16.3 NG/ML

## 2023-01-06 NOTE — ASU PREOP CHECKLIST - WAS PATIENT ON BETA BLOCKER?
[facility] [address] [address]  Phone: [phone]  Fax: [phone]    Mekhi Eubanks MD        January 6, 2023     Patient: Ambar Daniels   YOB: 1932   Date of Visit: 1/6/2023       Physician Orders:    1. Discontinue Hydrocodone, Tramadol, Ibuprofen and Aspercreme patch. 2.  Change Zoloft 25 mg one PO daily to be given in the morning.     3.  Change Aspirin 81 mg PO 3 times a week every Monday, Wednesday, Friday        Mekhi Eubanks MD
No

## 2023-01-09 LAB — HCG SERPL-MCNC: 892 MIU/ML

## 2023-01-27 ENCOUNTER — APPOINTMENT (OUTPATIENT)
Dept: OBGYN | Facility: CLINIC | Age: 43
End: 2023-01-27
Payer: COMMERCIAL

## 2023-01-27 ENCOUNTER — ASOB RESULT (OUTPATIENT)
Age: 43
End: 2023-01-27

## 2023-01-27 ENCOUNTER — APPOINTMENT (OUTPATIENT)
Dept: ANTEPARTUM | Facility: CLINIC | Age: 43
End: 2023-01-27
Payer: COMMERCIAL

## 2023-01-27 VITALS — HEIGHT: 61 IN | BODY MASS INDEX: 27.38 KG/M2 | WEIGHT: 145 LBS

## 2023-01-27 DIAGNOSIS — Z3A.12 12 WEEKS GESTATION OF PREGNANCY: ICD-10-CM

## 2023-01-27 DIAGNOSIS — N94.89 OTHER SPECIFIED CONDITIONS ASSOCIATED WITH FEMALE GENITAL ORGANS AND MENSTRUAL CYCLE: ICD-10-CM

## 2023-01-27 DIAGNOSIS — Z32.01 ENCOUNTER FOR PREGNANCY TEST, RESULT POSITIVE: ICD-10-CM

## 2023-01-27 DIAGNOSIS — O30.029: ICD-10-CM

## 2023-01-27 DIAGNOSIS — O35.9XX1 MATERNAL CARE FOR (SUSPECTED) FETAL ABNORMALITY AND DAMAGE, UNSPECIFIED, FETUS 1: ICD-10-CM

## 2023-01-27 DIAGNOSIS — O09.522 SUPERVISION OF ELDERLY MULTIGRAVIDA, SECOND TRIMESTER: ICD-10-CM

## 2023-01-27 DIAGNOSIS — Z98.890 OTHER SPECIFIED POSTPROCEDURAL STATES: ICD-10-CM

## 2023-01-27 DIAGNOSIS — O35.9XX2 MATERNAL CARE FOR (SUSPECTED) FETAL ABNORMALITY AND DAMAGE, UNSPECIFIED, FETUS 2: ICD-10-CM

## 2023-01-27 DIAGNOSIS — O30.021: ICD-10-CM

## 2023-01-27 LAB — HCG UR QL: POSITIVE

## 2023-01-27 PROCEDURE — 81025 URINE PREGNANCY TEST: CPT

## 2023-01-27 PROCEDURE — 99214 OFFICE O/P EST MOD 30 MIN: CPT

## 2023-01-27 PROCEDURE — 76817 TRANSVAGINAL US OBSTETRIC: CPT

## 2023-01-29 PROBLEM — Z32.01 PREGNANCY CONFIRMED BY POSITIVE URINE TEST: Status: RESOLVED | Noted: 2023-01-04 | Resolved: 2023-01-29

## 2023-01-29 PROBLEM — O09.522 MULTIGRAVIDA OF ADVANCED MATERNAL AGE IN SECOND TRIMESTER: Status: RESOLVED | Noted: 2020-08-13 | Resolved: 2023-01-29

## 2023-01-29 PROBLEM — Z98.890 POST-OPERATIVE STATE: Status: RESOLVED | Noted: 2022-03-22 | Resolved: 2023-01-29

## 2023-01-29 NOTE — PLAN
[FreeTextEntry1] : \par Transvaginal/pelvic ultrasound reviewed with patient in detail and consistent with a viable intrauterine pregnancy with EDC agree with reported dating via LMP.  EDC will remain 9/13/2023.  She return to office in 3 weeks time for initial prenatal appointment.  Patient was advised of high risk pregnancy secondary to advanced maternal age, poor obstetric history with a prior pregnancy of conjoined twins, history of preeclampsia in prior pregnancy, uterine fibroids.  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  She was given call, follow-up, ER precautions and all questions were addressed.  She will return to office as directed.

## 2023-01-29 NOTE — HISTORY OF PRESENT ILLNESS
[FreeTextEntry1] : 42-year-old female -0-1-1 presenting office for confirmation of viability.  She was initially seen in office on 2023.  Her LMP is 2022 placing her at 7 weeks 2 days gestation with an EDC of 2023.\par \par Obstetrical history 1 prior  section complicated by preeclampsia, and breech presentation.  Her last pregnancy was complicated by conjoined twins and she underwent a dilation and evacuation.

## 2023-02-14 ENCOUNTER — APPOINTMENT (OUTPATIENT)
Dept: OBGYN | Facility: CLINIC | Age: 43
End: 2023-02-14
Payer: COMMERCIAL

## 2023-02-14 ENCOUNTER — NON-APPOINTMENT (OUTPATIENT)
Age: 43
End: 2023-02-14

## 2023-02-14 VITALS
SYSTOLIC BLOOD PRESSURE: 120 MMHG | WEIGHT: 147 LBS | DIASTOLIC BLOOD PRESSURE: 70 MMHG | BODY MASS INDEX: 27.75 KG/M2 | HEIGHT: 61 IN

## 2023-02-14 DIAGNOSIS — B18.1 VIRAL HEPATITIS COMPLICATING PREGNANCY, UNSPECIFIED TRIMESTER: ICD-10-CM

## 2023-02-14 DIAGNOSIS — Z34.90 ENCOUNTER FOR SUPERVISION OF NORMAL PREGNANCY, UNSPECIFIED, UNSPECIFIED TRIMESTER: ICD-10-CM

## 2023-02-14 DIAGNOSIS — O98.419 VIRAL HEPATITIS COMPLICATING PREGNANCY, UNSPECIFIED TRIMESTER: ICD-10-CM

## 2023-02-14 DIAGNOSIS — N91.1 SECONDARY AMENORRHEA: ICD-10-CM

## 2023-02-14 PROCEDURE — 0500F INITIAL PRENATAL CARE VISIT: CPT

## 2023-02-26 NOTE — PLAN
[FreeTextEntry1] : 42-year-old female -0-2-1 at 4 weeks by LMP of 2022 for positive urine pregnancy test at home.  Discussed with the patient today that it is too early to do a sonogram.  We discussed serial beta hCGs.  She is very nervous as her last pregnancy was complicated by a termination secondary to conjoined twins.  Rx was provided for serial beta hCGs.  We will also get progesterone level with the first blood draw.  We will call with the results.  Do's and don'ts of the pregnancy were discussed with the patient at length.  She will return to the office in 2 to 3 weeks for a sonogram to confirm the pregnancy.  Advised the patient to continue prenatal vitamins.\par \par The patient also has a small superficial breast abscess that is draining.  No evidence of infection.  Advised the patient that she can take Tylenol as needed for discomfort.  Advised that she can also continue warm compresses to the area.  Would not give an antibiotic at this time as it already spontaneously draining.  Advised the patient to keep the area clean and dry.\par \par The patient was given the opportunity to ask questions and all were answered to her satisfaction.\par

## 2023-02-26 NOTE — HISTORY OF PRESENT ILLNESS
[FreeTextEntry1] : 42-year-old female -0-2-1 at 4 weeks by LMP of 2022 presents for visit.  The patient states that she just had a positive urine pregnancy test at home.  She was coming in for a breast tissue but wanted to alert us of her positive pregnancy test.  She has a history of a full-term  section.  She also has a history of 2 elective termination of pregnancies.  One of the terminations was secondary to conjoined twins which was performed at 13 weeks.  The patient is taking prenatal vitamins.  She denies any nausea or vomiting.  She denies any vaginal bleeding or cramping.  We discussed today that it is too early to do a sonogram.  She is aware, however would like to do serial beta hCGs secondary to her history.\par \par She is also here to discuss a right breast abscess that she noticed.  She states she had a similar abscess in 2021.  She states on  of this year she noticed a small lump underneath her right breast.  She states it was red and raised.  She used warm compresses on the abscess ruptured on .  She states she has been using Aquaphor on the area.  She has not taken any other medications.  She would like to have the area examined today to make sure that it has been completely drained and that no further treatment is needed.

## 2023-02-26 NOTE — PHYSICAL EXAM
[Chaperone Declined] : Patient declined chaperone [Appropriately responsive] : appropriately responsive [Alert] : alert [No Acute Distress] : no acute distress [Soft] : soft [Non-tender] : non-tender [Non-distended] : non-distended [No HSM] : No HSM [No Lesions] : no lesions [No Mass] : no mass [Oriented x3] : oriented x3 [Examination Of The Breasts] : a normal appearance [Normal] : normal [No Masses] : no breast masses were palpable

## 2023-03-08 ENCOUNTER — LABORATORY RESULT (OUTPATIENT)
Age: 43
End: 2023-03-08

## 2023-03-10 ENCOUNTER — NON-APPOINTMENT (OUTPATIENT)
Age: 43
End: 2023-03-10

## 2023-03-15 ENCOUNTER — NON-APPOINTMENT (OUTPATIENT)
Age: 43
End: 2023-03-15

## 2023-03-15 LAB
CHROMOSOME13 INTERPRETATION: NORMAL
CHROMOSOME13 TEST RESULT: NORMAL
CHROMOSOME18 INTERPRETATION: NORMAL
CHROMOSOME18 TEST RESULT: NORMAL
CHROMOSOME21 INTERPRETATION: NORMAL
CHROMOSOME21 TEST RESULT: NORMAL
FETAL FRACTION: NORMAL
PERFORMANCE AND LIMITATIONS: NORMAL
SEX CHROMOSOME INTERPRETATION: NORMAL
SEX CHROMOSOME TEST RESULT: NORMAL
VERIFI PRENATAL TEST: NOT DETECTED

## 2023-03-17 ENCOUNTER — APPOINTMENT (OUTPATIENT)
Dept: OBGYN | Facility: CLINIC | Age: 43
End: 2023-03-17
Payer: COMMERCIAL

## 2023-03-17 VITALS
BODY MASS INDEX: 28.32 KG/M2 | DIASTOLIC BLOOD PRESSURE: 62 MMHG | SYSTOLIC BLOOD PRESSURE: 106 MMHG | WEIGHT: 150 LBS | HEIGHT: 61 IN

## 2023-03-17 DIAGNOSIS — Z34.91 ENCOUNTER FOR SUPERVISION OF NORMAL PREGNANCY, UNSPECIFIED, FIRST TRIMESTER: ICD-10-CM

## 2023-03-17 PROCEDURE — 0502F SUBSEQUENT PRENATAL CARE: CPT

## 2023-03-31 ENCOUNTER — ASOB RESULT (OUTPATIENT)
Age: 43
End: 2023-03-31

## 2023-03-31 ENCOUNTER — APPOINTMENT (OUTPATIENT)
Dept: ANTEPARTUM | Facility: CLINIC | Age: 43
End: 2023-03-31
Payer: COMMERCIAL

## 2023-03-31 ENCOUNTER — NON-APPOINTMENT (OUTPATIENT)
Age: 43
End: 2023-03-31

## 2023-03-31 PROCEDURE — 76815 OB US LIMITED FETUS(S): CPT

## 2023-04-05 ENCOUNTER — NON-APPOINTMENT (OUTPATIENT)
Age: 43
End: 2023-04-05

## 2023-04-05 LAB
AFP MOM: 1.16
AFP VALUE: 40.8 NG/ML
ALPHA FETOPROTEIN SERUM COMMENT: NORMAL
ALPHA FETOPROTEIN SERUM INTERPRETATION: NORMAL
ALPHA FETOPROTEIN SERUM RESULTS: NORMAL
ALPHA FETOPROTEIN SERUM TEST RESULTS: NORMAL
GESTATIONAL AGE BASED ON: NORMAL
GESTATIONAL AGE ON COLLECTION DATE: 16 WEEKS
INSULIN DEP DIABETES: NO
MATERNAL AGE AT EDD AFP: 41 YR
MULTIPLE GESTATION: NO
OSBR RISK 1 IN: 7366
RACE: NORMAL
WEIGHT AFP: 145 LBS

## 2023-04-10 ENCOUNTER — NON-APPOINTMENT (OUTPATIENT)
Age: 43
End: 2023-04-10

## 2023-04-14 ENCOUNTER — APPOINTMENT (OUTPATIENT)
Dept: OBGYN | Facility: CLINIC | Age: 43
End: 2023-04-14
Payer: COMMERCIAL

## 2023-04-14 VITALS
WEIGHT: 156 LBS | HEIGHT: 61 IN | BODY MASS INDEX: 29.45 KG/M2 | DIASTOLIC BLOOD PRESSURE: 66 MMHG | SYSTOLIC BLOOD PRESSURE: 112 MMHG

## 2023-04-14 PROCEDURE — 0502F SUBSEQUENT PRENATAL CARE: CPT

## 2023-04-28 ENCOUNTER — ASOB RESULT (OUTPATIENT)
Age: 43
End: 2023-04-28

## 2023-04-28 ENCOUNTER — APPOINTMENT (OUTPATIENT)
Dept: ANTEPARTUM | Facility: CLINIC | Age: 43
End: 2023-04-28
Payer: COMMERCIAL

## 2023-04-28 PROCEDURE — 76811 OB US DETAILED SNGL FETUS: CPT

## 2023-04-28 PROCEDURE — 76817 TRANSVAGINAL US OBSTETRIC: CPT

## 2023-05-01 ENCOUNTER — NON-APPOINTMENT (OUTPATIENT)
Age: 43
End: 2023-05-01

## 2023-05-02 ENCOUNTER — NON-APPOINTMENT (OUTPATIENT)
Age: 43
End: 2023-05-02

## 2023-05-15 ENCOUNTER — APPOINTMENT (OUTPATIENT)
Dept: OBGYN | Facility: CLINIC | Age: 43
End: 2023-05-15
Payer: COMMERCIAL

## 2023-05-15 VITALS
BODY MASS INDEX: 28.35 KG/M2 | DIASTOLIC BLOOD PRESSURE: 73 MMHG | WEIGHT: 160 LBS | SYSTOLIC BLOOD PRESSURE: 128 MMHG | HEIGHT: 63 IN

## 2023-05-15 PROCEDURE — 0502F SUBSEQUENT PRENATAL CARE: CPT

## 2023-06-07 ENCOUNTER — LABORATORY RESULT (OUTPATIENT)
Age: 43
End: 2023-06-07

## 2023-06-16 ENCOUNTER — NON-APPOINTMENT (OUTPATIENT)
Age: 43
End: 2023-06-16

## 2023-06-23 ENCOUNTER — APPOINTMENT (OUTPATIENT)
Dept: ANTEPARTUM | Facility: CLINIC | Age: 43
End: 2023-06-23
Payer: COMMERCIAL

## 2023-06-23 ENCOUNTER — ASOB RESULT (OUTPATIENT)
Age: 43
End: 2023-06-23

## 2023-06-23 PROCEDURE — 76816 OB US FOLLOW-UP PER FETUS: CPT

## 2023-06-23 PROCEDURE — 76817 TRANSVAGINAL US OBSTETRIC: CPT

## 2023-06-26 ENCOUNTER — NON-APPOINTMENT (OUTPATIENT)
Age: 43
End: 2023-06-26

## 2023-06-29 ENCOUNTER — APPOINTMENT (OUTPATIENT)
Dept: OBGYN | Facility: CLINIC | Age: 43
End: 2023-06-29
Payer: COMMERCIAL

## 2023-06-29 VITALS
BODY MASS INDEX: 30.12 KG/M2 | HEIGHT: 63 IN | SYSTOLIC BLOOD PRESSURE: 102 MMHG | WEIGHT: 170 LBS | DIASTOLIC BLOOD PRESSURE: 62 MMHG

## 2023-06-29 PROCEDURE — 0502F SUBSEQUENT PRENATAL CARE: CPT

## 2023-07-10 ENCOUNTER — APPOINTMENT (OUTPATIENT)
Dept: OBGYN | Facility: CLINIC | Age: 43
End: 2023-07-10
Payer: COMMERCIAL

## 2023-07-10 VITALS
WEIGHT: 170 LBS | DIASTOLIC BLOOD PRESSURE: 71 MMHG | BODY MASS INDEX: 30.12 KG/M2 | SYSTOLIC BLOOD PRESSURE: 110 MMHG | HEIGHT: 63 IN

## 2023-07-10 DIAGNOSIS — Z34.92 ENCOUNTER FOR SUPERVISION OF NORMAL PREGNANCY, UNSPECIFIED, SECOND TRIMESTER: ICD-10-CM

## 2023-07-10 PROCEDURE — 0502F SUBSEQUENT PRENATAL CARE: CPT

## 2023-07-21 ENCOUNTER — APPOINTMENT (OUTPATIENT)
Dept: ANTEPARTUM | Facility: CLINIC | Age: 43
End: 2023-07-21
Payer: COMMERCIAL

## 2023-07-21 ENCOUNTER — ASOB RESULT (OUTPATIENT)
Age: 43
End: 2023-07-21

## 2023-07-21 PROCEDURE — 76816 OB US FOLLOW-UP PER FETUS: CPT

## 2023-07-21 PROCEDURE — 76819 FETAL BIOPHYS PROFIL W/O NST: CPT

## 2023-07-25 ENCOUNTER — NON-APPOINTMENT (OUTPATIENT)
Age: 43
End: 2023-07-25

## 2023-07-25 NOTE — DISCHARGE NOTE OB - AVOID DOUCHING OR TAMPONS UNTIL YOUR POSTPARTUM VISIT
Statement Selected [Assessment] : an assessment [FreeTextEntry1] : ITP to be reviewed and manually signed by Dr. Landis; ITP to be finalized at session #1; Clinical indication for cardiac rehabilitation: PCI [Intake Visit] : an intake visit

## 2023-07-26 ENCOUNTER — APPOINTMENT (OUTPATIENT)
Dept: OBGYN | Facility: CLINIC | Age: 43
End: 2023-07-26
Payer: COMMERCIAL

## 2023-07-26 VITALS
DIASTOLIC BLOOD PRESSURE: 70 MMHG | BODY MASS INDEX: 30.3 KG/M2 | WEIGHT: 171 LBS | SYSTOLIC BLOOD PRESSURE: 110 MMHG | HEIGHT: 63 IN

## 2023-07-26 PROCEDURE — 0502F SUBSEQUENT PRENATAL CARE: CPT

## 2023-08-10 ENCOUNTER — APPOINTMENT (OUTPATIENT)
Dept: OBGYN | Facility: CLINIC | Age: 43
End: 2023-08-10
Payer: COMMERCIAL

## 2023-08-10 VITALS
SYSTOLIC BLOOD PRESSURE: 116 MMHG | BODY MASS INDEX: 30.48 KG/M2 | WEIGHT: 172 LBS | DIASTOLIC BLOOD PRESSURE: 77 MMHG | HEIGHT: 63 IN

## 2023-08-10 PROCEDURE — 0502F SUBSEQUENT PRENATAL CARE: CPT

## 2023-08-14 NOTE — DISCHARGE NOTE OB - PERSISTENT HEADACHE, BLURRED VISION
comfortable appearance comfortable appearance/resting/sleeping comfortable appearance Statement Selected

## 2023-08-17 ENCOUNTER — APPOINTMENT (OUTPATIENT)
Dept: OBGYN | Facility: CLINIC | Age: 43
End: 2023-08-17
Payer: COMMERCIAL

## 2023-08-17 VITALS — SYSTOLIC BLOOD PRESSURE: 121 MMHG | HEIGHT: 63 IN | DIASTOLIC BLOOD PRESSURE: 83 MMHG

## 2023-08-17 PROCEDURE — 0502F SUBSEQUENT PRENATAL CARE: CPT

## 2023-08-18 ENCOUNTER — APPOINTMENT (OUTPATIENT)
Dept: ANTEPARTUM | Facility: CLINIC | Age: 43
End: 2023-08-18
Payer: COMMERCIAL

## 2023-08-18 ENCOUNTER — ASOB RESULT (OUTPATIENT)
Age: 43
End: 2023-08-18

## 2023-08-18 PROCEDURE — ZZZZZ: CPT

## 2023-08-18 PROCEDURE — 76818 FETAL BIOPHYS PROFILE W/NST: CPT

## 2023-08-18 PROCEDURE — 76816 OB US FOLLOW-UP PER FETUS: CPT

## 2023-08-21 LAB — B-HEM STREP SPEC QL CULT: NORMAL

## 2023-08-22 ENCOUNTER — NON-APPOINTMENT (OUTPATIENT)
Age: 43
End: 2023-08-22

## 2023-08-23 ENCOUNTER — APPOINTMENT (OUTPATIENT)
Dept: OBGYN | Facility: CLINIC | Age: 43
End: 2023-08-23
Payer: COMMERCIAL

## 2023-08-23 VITALS
HEIGHT: 63 IN | SYSTOLIC BLOOD PRESSURE: 131 MMHG | DIASTOLIC BLOOD PRESSURE: 84 MMHG | WEIGHT: 175 LBS | BODY MASS INDEX: 31.01 KG/M2

## 2023-08-23 PROCEDURE — 0502F SUBSEQUENT PRENATAL CARE: CPT

## 2023-08-24 ENCOUNTER — APPOINTMENT (OUTPATIENT)
Dept: ANTEPARTUM | Facility: CLINIC | Age: 43
End: 2023-08-24
Payer: COMMERCIAL

## 2023-08-24 ENCOUNTER — ASOB RESULT (OUTPATIENT)
Age: 43
End: 2023-08-24

## 2023-08-24 LAB
HIV1+2 AB SPEC QL IA.RAPID: NONREACTIVE
T PALLIDUM AB SER QL IA: NEGATIVE

## 2023-08-24 PROCEDURE — 76818 FETAL BIOPHYS PROFILE W/NST: CPT

## 2023-08-29 ENCOUNTER — APPOINTMENT (OUTPATIENT)
Dept: OBGYN | Facility: CLINIC | Age: 43
End: 2023-08-29
Payer: COMMERCIAL

## 2023-08-29 VITALS
SYSTOLIC BLOOD PRESSURE: 122 MMHG | BODY MASS INDEX: 31.18 KG/M2 | DIASTOLIC BLOOD PRESSURE: 74 MMHG | WEIGHT: 176 LBS | HEIGHT: 63 IN

## 2023-08-29 PROCEDURE — 0502F SUBSEQUENT PRENATAL CARE: CPT

## 2023-08-31 ENCOUNTER — APPOINTMENT (OUTPATIENT)
Dept: ANTEPARTUM | Facility: CLINIC | Age: 43
End: 2023-08-31
Payer: COMMERCIAL

## 2023-08-31 ENCOUNTER — ASOB RESULT (OUTPATIENT)
Age: 43
End: 2023-08-31

## 2023-08-31 PROCEDURE — 76818 FETAL BIOPHYS PROFILE W/NST: CPT

## 2023-09-05 ENCOUNTER — OUTPATIENT (OUTPATIENT)
Dept: OUTPATIENT SERVICES | Facility: HOSPITAL | Age: 43
LOS: 1 days | End: 2023-09-05
Payer: COMMERCIAL

## 2023-09-05 ENCOUNTER — TRANSCRIPTION ENCOUNTER (OUTPATIENT)
Age: 43
End: 2023-09-05

## 2023-09-05 DIAGNOSIS — Z98.890 OTHER SPECIFIED POSTPROCEDURAL STATES: Chronic | ICD-10-CM

## 2023-09-05 DIAGNOSIS — Z98.891 HISTORY OF UTERINE SCAR FROM PREVIOUS SURGERY: Chronic | ICD-10-CM

## 2023-09-05 DIAGNOSIS — Z01.812 ENCOUNTER FOR PREPROCEDURAL LABORATORY EXAMINATION: ICD-10-CM

## 2023-09-05 LAB
BASOPHILS # BLD AUTO: 0.04 K/UL — SIGNIFICANT CHANGE UP (ref 0–0.2)
BASOPHILS NFR BLD AUTO: 0.5 % — SIGNIFICANT CHANGE UP (ref 0–2)
BLD GP AB SCN SERPL QL: SIGNIFICANT CHANGE UP
COMMENT - BLOOD BANK: SIGNIFICANT CHANGE UP
EOSINOPHIL # BLD AUTO: 0.11 K/UL — SIGNIFICANT CHANGE UP (ref 0–0.5)
EOSINOPHIL NFR BLD AUTO: 1.3 % — SIGNIFICANT CHANGE UP (ref 0–6)
HCT VFR BLD CALC: 35.2 % — SIGNIFICANT CHANGE UP (ref 34.5–45)
HGB BLD-MCNC: 11.9 G/DL — SIGNIFICANT CHANGE UP (ref 11.5–15.5)
IMM GRANULOCYTES NFR BLD AUTO: 1.7 % — HIGH (ref 0–0.9)
LYMPHOCYTES # BLD AUTO: 1.2 K/UL — SIGNIFICANT CHANGE UP (ref 1–3.3)
LYMPHOCYTES # BLD AUTO: 13.8 % — SIGNIFICANT CHANGE UP (ref 13–44)
MCHC RBC-ENTMCNC: 28.4 PG — SIGNIFICANT CHANGE UP (ref 27–34)
MCHC RBC-ENTMCNC: 33.8 GM/DL — SIGNIFICANT CHANGE UP (ref 32–36)
MCV RBC AUTO: 84 FL — SIGNIFICANT CHANGE UP (ref 80–100)
MONOCYTES # BLD AUTO: 0.44 K/UL — SIGNIFICANT CHANGE UP (ref 0–0.9)
MONOCYTES NFR BLD AUTO: 5.1 % — SIGNIFICANT CHANGE UP (ref 2–14)
NEUTROPHILS # BLD AUTO: 6.76 K/UL — SIGNIFICANT CHANGE UP (ref 1.8–7.4)
NEUTROPHILS NFR BLD AUTO: 77.6 % — HIGH (ref 43–77)
PLATELET # BLD AUTO: 174 K/UL — SIGNIFICANT CHANGE UP (ref 150–400)
RBC # BLD: 4.19 M/UL — SIGNIFICANT CHANGE UP (ref 3.8–5.2)
RBC # FLD: 14.2 % — SIGNIFICANT CHANGE UP (ref 10.3–14.5)
WBC # BLD: 8.7 K/UL — SIGNIFICANT CHANGE UP (ref 3.8–10.5)
WBC # FLD AUTO: 8.7 K/UL — SIGNIFICANT CHANGE UP (ref 3.8–10.5)

## 2023-09-05 PROCEDURE — 36415 COLL VENOUS BLD VENIPUNCTURE: CPT

## 2023-09-05 PROCEDURE — 86901 BLOOD TYPING SEROLOGIC RH(D): CPT

## 2023-09-05 PROCEDURE — 86900 BLOOD TYPING SEROLOGIC ABO: CPT

## 2023-09-05 PROCEDURE — 86850 RBC ANTIBODY SCREEN: CPT

## 2023-09-05 PROCEDURE — 85025 COMPLETE CBC W/AUTO DIFF WBC: CPT

## 2023-09-06 ENCOUNTER — TRANSCRIPTION ENCOUNTER (OUTPATIENT)
Age: 43
End: 2023-09-06

## 2023-09-06 ENCOUNTER — INPATIENT (INPATIENT)
Facility: HOSPITAL | Age: 43
LOS: 1 days | Discharge: HOME | End: 2023-09-08
Payer: COMMERCIAL

## 2023-09-06 ENCOUNTER — APPOINTMENT (OUTPATIENT)
Dept: OBGYN | Facility: HOSPITAL | Age: 43
End: 2023-09-06

## 2023-09-06 VITALS
TEMPERATURE: 98 F | SYSTOLIC BLOOD PRESSURE: 147 MMHG | WEIGHT: 175.93 LBS | HEIGHT: 61 IN | RESPIRATION RATE: 18 BRPM | DIASTOLIC BLOOD PRESSURE: 81 MMHG | HEART RATE: 75 BPM

## 2023-09-06 DIAGNOSIS — O34.2: ICD-10-CM

## 2023-09-06 DIAGNOSIS — Z98.891 HISTORY OF UTERINE SCAR FROM PREVIOUS SURGERY: Chronic | ICD-10-CM

## 2023-09-06 DIAGNOSIS — Z98.890 OTHER SPECIFIED POSTPROCEDURAL STATES: Chronic | ICD-10-CM

## 2023-09-06 LAB
ALBUMIN SERPL ELPH-MCNC: 3.8 G/DL — SIGNIFICANT CHANGE UP (ref 3.3–5.2)
ALP SERPL-CCNC: 146 U/L — HIGH (ref 40–120)
ALT FLD-CCNC: 10 U/L — SIGNIFICANT CHANGE UP
ANION GAP SERPL CALC-SCNC: 16 MMOL/L — SIGNIFICANT CHANGE UP (ref 5–17)
ANISOCYTOSIS BLD QL: SLIGHT — SIGNIFICANT CHANGE UP
APPEARANCE UR: CLEAR — SIGNIFICANT CHANGE UP
APTT BLD: 27.9 SEC — SIGNIFICANT CHANGE UP (ref 24.5–35.6)
AST SERPL-CCNC: 17 U/L — SIGNIFICANT CHANGE UP
BACTERIA # UR AUTO: ABNORMAL
BASOPHILS # BLD AUTO: 0.18 K/UL — SIGNIFICANT CHANGE UP (ref 0–0.2)
BASOPHILS NFR BLD AUTO: 1.7 % — SIGNIFICANT CHANGE UP (ref 0–2)
BILIRUB SERPL-MCNC: 0.4 MG/DL — SIGNIFICANT CHANGE UP (ref 0.4–2)
BILIRUB UR-MCNC: NEGATIVE — SIGNIFICANT CHANGE UP
BUN SERPL-MCNC: 11.5 MG/DL — SIGNIFICANT CHANGE UP (ref 8–20)
CALCIUM SERPL-MCNC: 9 MG/DL — SIGNIFICANT CHANGE UP (ref 8.4–10.5)
CHLORIDE SERPL-SCNC: 101 MMOL/L — SIGNIFICANT CHANGE UP (ref 96–108)
CO2 SERPL-SCNC: 19 MMOL/L — LOW (ref 22–29)
COLOR SPEC: YELLOW — SIGNIFICANT CHANGE UP
CREAT ?TM UR-MCNC: 88 MG/DL — SIGNIFICANT CHANGE UP
CREAT SERPL-MCNC: 0.45 MG/DL — LOW (ref 0.5–1.3)
DIFF PNL FLD: ABNORMAL
EGFR: 124 ML/MIN/1.73M2 — SIGNIFICANT CHANGE UP
EOSINOPHIL # BLD AUTO: 0.19 K/UL — SIGNIFICANT CHANGE UP (ref 0–0.5)
EOSINOPHIL NFR BLD AUTO: 1.8 % — SIGNIFICANT CHANGE UP (ref 0–6)
EPI CELLS # UR: SIGNIFICANT CHANGE UP
FIBRINOGEN PPP-MCNC: 612 MG/DL — HIGH (ref 200–450)
GLUCOSE SERPL-MCNC: 78 MG/DL — SIGNIFICANT CHANGE UP (ref 70–99)
GLUCOSE UR QL: NEGATIVE MG/DL — SIGNIFICANT CHANGE UP
HCT VFR BLD CALC: 39.7 % — SIGNIFICANT CHANGE UP (ref 34.5–45)
HGB BLD-MCNC: 13.2 G/DL — SIGNIFICANT CHANGE UP (ref 11.5–15.5)
INR BLD: 0.87 RATIO — SIGNIFICANT CHANGE UP (ref 0.85–1.18)
KETONES UR-MCNC: NEGATIVE — SIGNIFICANT CHANGE UP
LDH SERPL L TO P-CCNC: 172 U/L — SIGNIFICANT CHANGE UP (ref 98–192)
LEUKOCYTE ESTERASE UR-ACNC: NEGATIVE — SIGNIFICANT CHANGE UP
LYMPHOCYTES # BLD AUTO: 2.36 K/UL — SIGNIFICANT CHANGE UP (ref 1–3.3)
LYMPHOCYTES # BLD AUTO: 22.6 % — SIGNIFICANT CHANGE UP (ref 13–44)
MANUAL SMEAR VERIFICATION: SIGNIFICANT CHANGE UP
MCHC RBC-ENTMCNC: 27.9 PG — SIGNIFICANT CHANGE UP (ref 27–34)
MCHC RBC-ENTMCNC: 33.2 GM/DL — SIGNIFICANT CHANGE UP (ref 32–36)
MCV RBC AUTO: 83.9 FL — SIGNIFICANT CHANGE UP (ref 80–100)
MICROCYTES BLD QL: SLIGHT — SIGNIFICANT CHANGE UP
MONOCYTES # BLD AUTO: 0.54 K/UL — SIGNIFICANT CHANGE UP (ref 0–0.9)
MONOCYTES NFR BLD AUTO: 5.2 % — SIGNIFICANT CHANGE UP (ref 2–14)
MYELOCYTES NFR BLD: 0.9 % — HIGH (ref 0–0)
NEUTROPHILS # BLD AUTO: 7.09 K/UL — SIGNIFICANT CHANGE UP (ref 1.8–7.4)
NEUTROPHILS NFR BLD AUTO: 67.8 % — SIGNIFICANT CHANGE UP (ref 43–77)
NITRITE UR-MCNC: NEGATIVE — SIGNIFICANT CHANGE UP
PH UR: 6 — SIGNIFICANT CHANGE UP (ref 5–8)
PLAT MORPH BLD: NORMAL — SIGNIFICANT CHANGE UP
PLATELET # BLD AUTO: 178 K/UL — SIGNIFICANT CHANGE UP (ref 150–400)
POIKILOCYTOSIS BLD QL AUTO: SLIGHT — SIGNIFICANT CHANGE UP
POLYCHROMASIA BLD QL SMEAR: SLIGHT — SIGNIFICANT CHANGE UP
POTASSIUM SERPL-MCNC: 3.6 MMOL/L — SIGNIFICANT CHANGE UP (ref 3.5–5.3)
POTASSIUM SERPL-SCNC: 3.6 MMOL/L — SIGNIFICANT CHANGE UP (ref 3.5–5.3)
PROT ?TM UR-MCNC: 10 MG/DL — SIGNIFICANT CHANGE UP (ref 0–12)
PROT SERPL-MCNC: 7 G/DL — SIGNIFICANT CHANGE UP (ref 6.6–8.7)
PROT UR-MCNC: NEGATIVE — SIGNIFICANT CHANGE UP
PROT/CREAT UR-RTO: 0.1 RATIO — SIGNIFICANT CHANGE UP
PROTHROM AB SERPL-ACNC: 9.7 SEC — SIGNIFICANT CHANGE UP (ref 9.5–13)
RBC # BLD: 4.73 M/UL — SIGNIFICANT CHANGE UP (ref 3.8–5.2)
RBC # FLD: 14.3 % — SIGNIFICANT CHANGE UP (ref 10.3–14.5)
RBC BLD AUTO: ABNORMAL
RBC CASTS # UR COMP ASSIST: SIGNIFICANT CHANGE UP /HPF (ref 0–4)
SODIUM SERPL-SCNC: 136 MMOL/L — SIGNIFICANT CHANGE UP (ref 135–145)
SP GR SPEC: 1.01 — SIGNIFICANT CHANGE UP (ref 1.01–1.02)
T PALLIDUM AB TITR SER: NEGATIVE — SIGNIFICANT CHANGE UP
URATE SERPL-MCNC: 3.9 MG/DL — SIGNIFICANT CHANGE UP (ref 2.4–5.7)
UROBILINOGEN FLD QL: NEGATIVE MG/DL — SIGNIFICANT CHANGE UP
WBC # BLD: 10.45 K/UL — SIGNIFICANT CHANGE UP (ref 3.8–10.5)
WBC # FLD AUTO: 10.45 K/UL — SIGNIFICANT CHANGE UP (ref 3.8–10.5)
WBC UR QL: SIGNIFICANT CHANGE UP /HPF (ref 0–5)

## 2023-09-06 PROCEDURE — 59510 CESAREAN DELIVERY: CPT | Mod: U9,GC

## 2023-09-06 RX ORDER — FAMOTIDINE 10 MG/ML
20 INJECTION INTRAVENOUS ONCE
Refills: 0 | Status: COMPLETED | OUTPATIENT
Start: 2023-09-06 | End: 2023-09-06

## 2023-09-06 RX ORDER — LANOLIN
1 OINTMENT (GRAM) TOPICAL EVERY 6 HOURS
Refills: 0 | Status: DISCONTINUED | OUTPATIENT
Start: 2023-09-06 | End: 2023-09-08

## 2023-09-06 RX ORDER — ACETAMINOPHEN 500 MG
975 TABLET ORAL ONCE
Refills: 0 | Status: COMPLETED | OUTPATIENT
Start: 2023-09-06 | End: 2023-09-06

## 2023-09-06 RX ORDER — SCOPALAMINE 1 MG/3D
1 PATCH, EXTENDED RELEASE TRANSDERMAL ONCE
Refills: 0 | Status: COMPLETED | OUTPATIENT
Start: 2023-09-06 | End: 2023-09-06

## 2023-09-06 RX ORDER — SODIUM CHLORIDE 9 MG/ML
1000 INJECTION, SOLUTION INTRAVENOUS
Refills: 0 | Status: DISCONTINUED | OUTPATIENT
Start: 2023-09-06 | End: 2023-09-06

## 2023-09-06 RX ORDER — OXYCODONE HYDROCHLORIDE 5 MG/1
5 TABLET ORAL ONCE
Refills: 0 | Status: DISCONTINUED | OUTPATIENT
Start: 2023-09-06 | End: 2023-09-08

## 2023-09-06 RX ORDER — CITRIC ACID/SODIUM CITRATE 300-500 MG
30 SOLUTION, ORAL ORAL ONCE
Refills: 0 | Status: DISCONTINUED | OUTPATIENT
Start: 2023-09-06 | End: 2023-09-06

## 2023-09-06 RX ORDER — IBUPROFEN 200 MG
600 TABLET ORAL EVERY 6 HOURS
Refills: 0 | Status: COMPLETED | OUTPATIENT
Start: 2023-09-06 | End: 2024-08-04

## 2023-09-06 RX ORDER — CEPHALEXIN 500 MG
1 CAPSULE ORAL
Qty: 14 | Refills: 0
Start: 2023-09-06 | End: 2023-09-12

## 2023-09-06 RX ORDER — OXYCODONE HYDROCHLORIDE 5 MG/1
5 TABLET ORAL
Refills: 0 | Status: DISCONTINUED | OUTPATIENT
Start: 2023-09-06 | End: 2023-09-08

## 2023-09-06 RX ORDER — CEFAZOLIN SODIUM 1 G
2000 VIAL (EA) INJECTION ONCE
Refills: 0 | Status: COMPLETED | OUTPATIENT
Start: 2023-09-06 | End: 2023-09-06

## 2023-09-06 RX ORDER — SODIUM CHLORIDE 9 MG/ML
1000 INJECTION, SOLUTION INTRAVENOUS
Refills: 0 | Status: DISCONTINUED | OUTPATIENT
Start: 2023-09-06 | End: 2023-09-08

## 2023-09-06 RX ORDER — ENOXAPARIN SODIUM 100 MG/ML
40 INJECTION SUBCUTANEOUS EVERY 24 HOURS
Refills: 0 | Status: DISCONTINUED | OUTPATIENT
Start: 2023-09-06 | End: 2023-09-08

## 2023-09-06 RX ORDER — SODIUM CHLORIDE 9 MG/ML
1000 INJECTION, SOLUTION INTRAVENOUS ONCE
Refills: 0 | Status: COMPLETED | OUTPATIENT
Start: 2023-09-06 | End: 2023-09-06

## 2023-09-06 RX ORDER — OXYTOCIN 10 UNIT/ML
333.33 VIAL (ML) INJECTION
Qty: 20 | Refills: 0 | Status: DISCONTINUED | OUTPATIENT
Start: 2023-09-06 | End: 2023-09-08

## 2023-09-06 RX ORDER — SIMETHICONE 80 MG/1
80 TABLET, CHEWABLE ORAL EVERY 4 HOURS
Refills: 0 | Status: DISCONTINUED | OUTPATIENT
Start: 2023-09-06 | End: 2023-09-08

## 2023-09-06 RX ORDER — CEFAZOLIN SODIUM 1 G
2000 VIAL (EA) INJECTION ONCE
Refills: 0 | Status: DISCONTINUED | OUTPATIENT
Start: 2023-09-06 | End: 2023-09-06

## 2023-09-06 RX ORDER — KETOROLAC TROMETHAMINE 30 MG/ML
30 SYRINGE (ML) INJECTION EVERY 6 HOURS
Refills: 0 | Status: COMPLETED | OUTPATIENT
Start: 2023-09-06 | End: 2023-09-07

## 2023-09-06 RX ORDER — OXYTOCIN 10 UNIT/ML
333.33 VIAL (ML) INJECTION
Qty: 20 | Refills: 0 | Status: COMPLETED | OUTPATIENT
Start: 2023-09-06 | End: 2023-09-06

## 2023-09-06 RX ORDER — ACETAMINOPHEN 500 MG
975 TABLET ORAL
Refills: 0 | Status: DISCONTINUED | OUTPATIENT
Start: 2023-09-06 | End: 2023-09-08

## 2023-09-06 RX ORDER — DIPHENHYDRAMINE HCL 50 MG
25 CAPSULE ORAL EVERY 6 HOURS
Refills: 0 | Status: DISCONTINUED | OUTPATIENT
Start: 2023-09-06 | End: 2023-09-08

## 2023-09-06 RX ORDER — MAGNESIUM HYDROXIDE 400 MG/1
30 TABLET, CHEWABLE ORAL
Refills: 0 | Status: DISCONTINUED | OUTPATIENT
Start: 2023-09-06 | End: 2023-09-08

## 2023-09-06 RX ORDER — TETANUS TOXOID, REDUCED DIPHTHERIA TOXOID AND ACELLULAR PERTUSSIS VACCINE, ADSORBED 5; 2.5; 8; 8; 2.5 [IU]/.5ML; [IU]/.5ML; UG/.5ML; UG/.5ML; UG/.5ML
0.5 SUSPENSION INTRAMUSCULAR ONCE
Refills: 0 | Status: DISCONTINUED | OUTPATIENT
Start: 2023-09-06 | End: 2023-09-08

## 2023-09-06 RX ADMIN — SCOPALAMINE 1 PATCH: 1 PATCH, EXTENDED RELEASE TRANSDERMAL at 10:46

## 2023-09-06 RX ADMIN — Medication 1000 MILLIUNIT(S)/MIN: at 09:19

## 2023-09-06 RX ADMIN — FAMOTIDINE 20 MILLIGRAM(S): 10 INJECTION INTRAVENOUS at 08:32

## 2023-09-06 RX ADMIN — Medication 975 MILLIGRAM(S): at 16:00

## 2023-09-06 RX ADMIN — SCOPALAMINE 1 PATCH: 1 PATCH, EXTENDED RELEASE TRANSDERMAL at 06:56

## 2023-09-06 RX ADMIN — SODIUM CHLORIDE 125 MILLILITER(S): 9 INJECTION, SOLUTION INTRAVENOUS at 07:11

## 2023-09-06 RX ADMIN — ENOXAPARIN SODIUM 40 MILLIGRAM(S): 100 INJECTION SUBCUTANEOUS at 21:01

## 2023-09-06 RX ADMIN — SODIUM CHLORIDE 2000 MILLILITER(S): 9 INJECTION, SOLUTION INTRAVENOUS at 06:26

## 2023-09-06 RX ADMIN — Medication 975 MILLIGRAM(S): at 06:54

## 2023-09-06 RX ADMIN — Medication 975 MILLIGRAM(S): at 20:33

## 2023-09-06 RX ADMIN — Medication 975 MILLIGRAM(S): at 15:05

## 2023-09-06 RX ADMIN — Medication 1000 MILLIUNIT(S)/MIN: at 12:22

## 2023-09-06 RX ADMIN — Medication 30 MILLIGRAM(S): at 17:58

## 2023-09-06 RX ADMIN — Medication 2000 MILLIGRAM(S): at 08:53

## 2023-09-06 NOTE — OB RN PATIENT PROFILE - FALL HARM RISK - UNIVERSAL INTERVENTIONS
Bed in lowest position, wheels locked, appropriate side rails in place/Call bell, personal items and telephone in reach/Instruct patient to call for assistance before getting out of bed or chair/Non-slip footwear when patient is out of bed/[place] to call system/Physically safe environment - no spills, clutter or unnecessary equipment/Purposeful Proactive Rounding/Room/bathroom lighting operational, light cord in reach

## 2023-09-06 NOTE — OB RN DELIVERY SUMMARY - NSSELHIDDEN_OBGYN_ALL_OB_FT
[NS_DeliveryAttending1_OBGYN_ALL_OB_FT:[id number]==],[NS_DeliveryRN_OBGYN_ALL_OB_FT:[id number]=] [NS_DeliveryAttending1_OBGYN_ALL_OB_FT:[id number]==],[NS_DeliveryRN_OBGYN_ALL_OB_FT:[id number]=],[NS_DeliveryAssist1_OBGYN_ALL_OB_FT:[id number]=]

## 2023-09-06 NOTE — OB RN DELIVERY SUMMARY - NS_SEPSISRSKCALC_OBGYN_ALL_OB_FT
EOS calculated successfully. EOS Risk Factor: 0.5/1000 live births ([facility] national incidence); GA=39w;Temp=97.8; ROM=0.017; GBS='Negative'; Antibiotics='No antibiotics or any antibiotics < 2 hrs prior to birth'

## 2023-09-06 NOTE — OB PROVIDER DELIVERY SUMMARY - NSPROVIDERDELIVERYNOTE_OBGYN_ALL_OB_FT
Pt taken to the OR for repeat C/S scheduled.  Not in labor.  Spinal anesthesia.  Low transverse  section was performed.  Delivered live male infant, vtx, through moderate amount of clear amniotic fluid.  x1 nuchal cord.  Uterus, tubes, ovaries WNL.   Hysterotomy was reapproximated with 0 monocryl suture, excellent hemostasis obtained.  Interceed used.  fascia reapproximated with 1 vicryl suture, excellent hemostasis obtained.  Subcutaneous closed with plain  skin closed with vicryl      [place]:   Weight:  3470g  Sex Assigned at Birth: male  APGARS:  9/9      QBL: 442  Uop: 200  IVF: 1000  Specimens: none  Intraoperative Complications: none  Dictation #: [id number]

## 2023-09-06 NOTE — OB PROVIDER H&P - NSLOWPPHRISK_OBGYN_A_OB
[place] Pregnancy/Less than or equal to 4 previous vaginal births/No known bleeding disorder/No history of postpartum hemorrhage/No other PPH risks indicated

## 2023-09-06 NOTE — OB RN INTRAOPERATIVE NOTE - NSSELHIDDEN_OBGYN_ALL_OB_FT
[NS_DeliveryAttending1_OBGYN_ALL_OB_FT:[id number]==],[NS_DeliveryRN_OBGYN_ALL_OB_FT:[id number]=],[NS_DeliveryAssist1_OBGYN_ALL_OB_FT:[id number]=]

## 2023-09-06 NOTE — OB PROVIDER H&P - ASSESSMENT
44yo  at 39w0d GA by LMP/1TM US who presents to L&D for repeat C/S.     -Admit to L&D  -Consent  -Admission labs  -IV fluids  -DVT Ppx: SCDS; plan for anticoagulation post-op  -Preoperative antibiotics  -FHT: NST reactive  --section    Discussed with Dr. Mercado

## 2023-09-06 NOTE — OB PROVIDER DELIVERY SUMMARY - NSSELHIDDEN_OBGYN_ALL_OB_FT
[NS_DeliveryAttending1_OBGYN_ALL_OB_FT:[id number]==],[NS_DeliveryRN_OBGYN_ALL_OB_FT:[id number]=],[NS_DeliveryAssist1_OBGYN_ALL_OB_FT:[id number]=]

## 2023-09-06 NOTE — OB RN PATIENT PROFILE - NSICDXPASTMEDICALHX_GEN_ALL_CORE_FT
PAST MEDICAL HISTORY:  2019 novel coronavirus disease (COVID-19) 7/2021    Asthma     Hypertension

## 2023-09-06 NOTE — OB PROVIDER H&P - NSRUBEOLASOURCE_OBGYN_ALL_OB
hard copy, drawn during this pregnancy Dapsone Pregnancy And Lactation Text: This medication is Pregnancy Category C and is not considered safe during pregnancy or breast feeding.

## 2023-09-06 NOTE — OB PROVIDER H&P - HISTORY OF PRESENT ILLNESS
42yo  at 39w0d GA by LMP/1TM US who presents to L&D for repeat C/S.   Denies vaginal bleeding, loss of fluid or regular contractions. +fetal movement, unchanged.   Denies fevers/chills/nausea/vomiting/lightheadedness/headache/chest pain/shortness of breath/changes in urination or bowel movements.   Denies changes in vision, headache, RUQ pain, calf swelling.     RACHEL: 23   LMP: 22     Pregnancy course:   -Partial previa   -AMA   -Hep B surface antigen carrier    -Hx pre-clampsia prior pregnancy     Obhx:   G1: pC/S (), breech, c/b PreEwSF   G2: D&E @13w GA for conjoined twins ()   SAB x1     Gynhx: -fibroids/-cysts; denies STI hx, denies abnormal paps   Pmhx: Anemia, hx pre-eclampsia   Pshx: prior C/S x1, D&E   Meds: PNV, ASA, oral iron   Allergies: NKDA   Social Hx: denies tobacco or alcohol use       Ultrasound: vertex, posterior placenta   EFW: 3043g (6lb 11oz) 68th %ile on 23

## 2023-09-07 ENCOUNTER — APPOINTMENT (OUTPATIENT)
Dept: ANTEPARTUM | Facility: CLINIC | Age: 43
End: 2023-09-07

## 2023-09-07 LAB
BASOPHILS # BLD AUTO: 0.07 K/UL — SIGNIFICANT CHANGE UP (ref 0–0.2)
BASOPHILS NFR BLD AUTO: 0.5 % — SIGNIFICANT CHANGE UP (ref 0–2)
EOSINOPHIL # BLD AUTO: 0.15 K/UL — SIGNIFICANT CHANGE UP (ref 0–0.5)
EOSINOPHIL NFR BLD AUTO: 1.1 % — SIGNIFICANT CHANGE UP (ref 0–6)
HCT VFR BLD CALC: 28.2 % — LOW (ref 34.5–45)
HGB BLD-MCNC: 9.6 G/DL — LOW (ref 11.5–15.5)
IMM GRANULOCYTES NFR BLD AUTO: 1.1 % — HIGH (ref 0–0.9)
LYMPHOCYTES # BLD AUTO: 23.2 % — SIGNIFICANT CHANGE UP (ref 13–44)
LYMPHOCYTES # BLD AUTO: 3.13 K/UL — SIGNIFICANT CHANGE UP (ref 1–3.3)
MCHC RBC-ENTMCNC: 29.5 PG — SIGNIFICANT CHANGE UP (ref 27–34)
MCHC RBC-ENTMCNC: 34 GM/DL — SIGNIFICANT CHANGE UP (ref 32–36)
MCV RBC AUTO: 86.8 FL — SIGNIFICANT CHANGE UP (ref 80–100)
MONOCYTES # BLD AUTO: 1.07 K/UL — HIGH (ref 0–0.9)
MONOCYTES NFR BLD AUTO: 7.9 % — SIGNIFICANT CHANGE UP (ref 2–14)
NEUTROPHILS # BLD AUTO: 8.91 K/UL — HIGH (ref 1.8–7.4)
NEUTROPHILS NFR BLD AUTO: 66.2 % — SIGNIFICANT CHANGE UP (ref 43–77)
PLATELET # BLD AUTO: 157 K/UL — SIGNIFICANT CHANGE UP (ref 150–400)
RBC # BLD: 3.25 M/UL — LOW (ref 3.8–5.2)
RBC # FLD: 14.6 % — HIGH (ref 10.3–14.5)
WBC # BLD: 13.48 K/UL — HIGH (ref 3.8–10.5)
WBC # FLD AUTO: 13.48 K/UL — HIGH (ref 3.8–10.5)

## 2023-09-07 RX ORDER — INFLUENZA VIRUS VACCINE 15; 15; 15; 15 UG/.5ML; UG/.5ML; UG/.5ML; UG/.5ML
0.5 SUSPENSION INTRAMUSCULAR ONCE
Refills: 0 | Status: COMPLETED | OUTPATIENT
Start: 2023-09-07 | End: 2023-09-08

## 2023-09-07 RX ORDER — IBUPROFEN 200 MG
600 TABLET ORAL EVERY 6 HOURS
Refills: 0 | Status: DISCONTINUED | OUTPATIENT
Start: 2023-09-07 | End: 2023-09-08

## 2023-09-07 RX ADMIN — ENOXAPARIN SODIUM 40 MILLIGRAM(S): 100 INJECTION SUBCUTANEOUS at 21:45

## 2023-09-07 RX ADMIN — Medication 975 MILLIGRAM(S): at 03:46

## 2023-09-07 RX ADMIN — Medication 975 MILLIGRAM(S): at 21:45

## 2023-09-07 RX ADMIN — Medication 30 MILLIGRAM(S): at 06:07

## 2023-09-07 RX ADMIN — Medication 975 MILLIGRAM(S): at 14:56

## 2023-09-07 RX ADMIN — Medication 600 MILLIGRAM(S): at 17:42

## 2023-09-07 RX ADMIN — Medication 600 MILLIGRAM(S): at 23:49

## 2023-09-07 RX ADMIN — Medication 975 MILLIGRAM(S): at 08:35

## 2023-09-07 RX ADMIN — Medication 30 MILLIGRAM(S): at 00:39

## 2023-09-07 NOTE — DISCHARGE NOTE OB - CARE PROVIDER_API CALL
Destiney Mercado  Obstetrics and Gynecology  [address], [address]  [place] [address]  Phone: [phone]  Fax: [phone]  Follow Up Time:

## 2023-09-07 NOTE — DISCHARGE NOTE OB - CARE PLAN
Principal Discharge DX:	 delivery delivered  Assessment and plan of treatment:	Please call your provider in 1 week. Take medications as directed, regular diet, activity as tolerated. Exclusive breast feeding for the first 6 months is recommended. Nothing per vagina for 6 weeks (incl. sex, douching, etc). If you have additional concerns, please inform your provider.   1

## 2023-09-07 NOTE — DISCHARGE NOTE OB - PATIENT PORTAL LINK FT
You can access the FollowMyHealth Patient Portal offered by [facility] by registering at the following website: [email or web address]. By joining [employer]’s FollowMyHealth portal, you will also be able to view your health information using other applications (apps) compatible with our system.

## 2023-09-07 NOTE — PROGRESS NOTE ADULT - ASSESSMENT
A/P:  43y  now POD#1 s/p repeat  section at 39w0d gestation, uncomplicated.   -Vital Signs Stable  -Voiding, tolerating PO, bowel function nml   -Heme: Hgb 13.2 --> AM labs pending   -Advance care as tolerated   -Continue routine postpartum/postoperative care and education  -Healthy male infant, desires circumcision.  -Dispo: Continue inpatient management

## 2023-09-07 NOTE — DISCHARGE NOTE OB - MEDICATION SUMMARY - MEDICATIONS TO TAKE
I will START or STAY ON the medications listed below when I get home from the hospital:    ibuprofen 600 mg oral tablet  -- 1 tab(s) by mouth every 6 hours  -- Indication: For pain    acetaminophen 325 mg oral tablet  -- 3 tab(s) orally  -- Indication: For pain

## 2023-09-08 VITALS
TEMPERATURE: 98 F | SYSTOLIC BLOOD PRESSURE: 124 MMHG | OXYGEN SATURATION: 99 % | HEART RATE: 69 BPM | RESPIRATION RATE: 18 BRPM | DIASTOLIC BLOOD PRESSURE: 77 MMHG

## 2023-09-08 DIAGNOSIS — D62 ACUTE POSTHEMORRHAGIC ANEMIA: ICD-10-CM

## 2023-09-08 PROCEDURE — 85384 FIBRINOGEN ACTIVITY: CPT

## 2023-09-08 PROCEDURE — 85730 THROMBOPLASTIN TIME PARTIAL: CPT

## 2023-09-08 PROCEDURE — 90686 IIV4 VACC NO PRSV 0.5 ML IM: CPT

## 2023-09-08 PROCEDURE — 36415 COLL VENOUS BLD VENIPUNCTURE: CPT

## 2023-09-08 PROCEDURE — 84156 ASSAY OF PROTEIN URINE: CPT

## 2023-09-08 PROCEDURE — 82570 ASSAY OF URINE CREATININE: CPT

## 2023-09-08 PROCEDURE — 83615 LACTATE (LD) (LDH) ENZYME: CPT

## 2023-09-08 PROCEDURE — 81001 URINALYSIS AUTO W/SCOPE: CPT

## 2023-09-08 PROCEDURE — 85610 PROTHROMBIN TIME: CPT

## 2023-09-08 PROCEDURE — 80053 COMPREHEN METABOLIC PANEL: CPT

## 2023-09-08 PROCEDURE — 84550 ASSAY OF BLOOD/URIC ACID: CPT

## 2023-09-08 PROCEDURE — 85025 COMPLETE CBC W/AUTO DIFF WBC: CPT

## 2023-09-08 PROCEDURE — 86780 TREPONEMA PALLIDUM: CPT

## 2023-09-08 RX ORDER — ACETAMINOPHEN 500 MG
3 TABLET ORAL
Qty: 30 | Refills: 0
Start: 2023-09-08

## 2023-09-08 RX ORDER — IBUPROFEN 200 MG
1 TABLET ORAL
Qty: 0 | Refills: 0 | DISCHARGE
Start: 2023-09-08

## 2023-09-08 RX ADMIN — Medication 975 MILLIGRAM(S): at 02:33

## 2023-09-08 RX ADMIN — INFLUENZA VIRUS VACCINE 0.5 MILLILITER(S): 15; 15; 15; 15 SUSPENSION INTRAMUSCULAR at 06:23

## 2023-09-08 RX ADMIN — Medication 600 MILLIGRAM(S): at 06:23

## 2023-09-08 RX ADMIN — Medication 600 MILLIGRAM(S): at 11:47

## 2023-09-08 RX ADMIN — Medication 975 MILLIGRAM(S): at 09:20

## 2023-09-08 NOTE — PROGRESS NOTE ADULT - SUBJECTIVE AND OBJECTIVE BOX
INTERVAL HPI/OVERNIGHT EVENTS:  43y Female s/p c section under spinal anesthesia with duramorph for post op analgesia on 9/6/23    Vital Signs Last 24 Hrs  T(C): 36.6 (07 Sep 2023 08:30), Max: 36.9 (06 Sep 2023 19:43)  T(F): 97.9 (07 Sep 2023 08:30), Max: 98.4 (06 Sep 2023 19:43)  HR: 68 (07 Sep 2023 08:30) (55 - 71)  BP: 127/72 (07 Sep 2023 08:30) (117/63 - 140/80)  BP(mean): --  RR: 18 (07 Sep 2023 08:30) (16 - 18)  SpO2: 97% (07 Sep 2023 08:30) (92% - 100%)    Parameters below as of 07 Sep 2023 08:30  Patient On (Oxygen Delivery Method): room air            Patient's overall anesthesia satisfaction: Positive    Patients pain is well controlled with IT duramorph    No respiratory events overnight    No pruritis at this time    Patient doing well     No headache      No residual numbness or weakness, sensory and motor function intact.    No anesthetic complications or complaints noted or reported          .            
MARCE KINNEY is a 43y  now POD#1 s/p repeat  section at 39w0d gestation, uncomplicated.     S:    The patient has no complaints.   Pain is controlled with current treatment regimen.   She is ambulating without difficulty and tolerating PO.   + flatus/-BM/+ voiding   She endorses appropriate lochia, which is decreasing.     O:    T(C): 36.8 (23 @ 04:10), Max: 37 (23 @ 10:03)  HR: 66 (23 @ 04:10) (55 - 75)  BP: 131/79 (23 @ 04:10) (117/58 - 147/81)  RR: 18 (23 @ 04:10) (16 - 18)  SpO2: 98% (23 @ 04:10) (92% - 100%)    Gen: NAD, AOx3  Pulm: Breathing comfortably on RA  Abdomen:  Soft, non-tender, non-distended  Uterus:  Fundus firm below umbilicus  Incision:  Clean/dry/intact with steri-strips in place  VE:  +Lochia  Ext:  Non-tender, non-edematous     LABS                        13.2   10.45 )-----------( 178      ( 06 Sep 2023 06:30 )             39.7         136  |  101  |  11.5  ----------------------------<  78  3.6   |  19.0<L>  |  0.45<L>    Ca    9.0      06 Sep 2023 07:20    TPro  7.0  /  Alb  3.8  /  TBili  0.4  /  DBili  x   /  AST  17  /  ALT  10  /  AlkPhos  146<H>          
43y  now POD#1 s/p repeat  section at 39w0d gestation, uncomplicated.     ICU Vital Signs Last 24 Hrs  T(C): 36.7 (08 Sep 2023 03:34), Max: 36.7 (07 Sep 2023 15:34)  T(F): 98 (08 Sep 2023 03:34), Max: 98.1 (07 Sep 2023 15:34)  HR: 69 (08 Sep 2023 03:34) (69 - 78)  BP: 124/77 (08 Sep 2023 03:34) (121/73 - 137/78)  RR: 18 (08 Sep 2023 03:34) (17 - 18)  SpO2: 99% (08 Sep 2023 03:34) (99% - 100%)    Abd: soft, NT, fundus firm  Ext: NT                9.6    13.48 )-----------( 157      ( 07 Sep 2023 06:20 )             28.2

## 2023-09-08 NOTE — PROGRESS NOTE ADULT - ASSESSMENT
A/P:  43y  now POD#2 s/p repeat  section at 39w0d gestation, uncomplicated.   -Vital Signs Stable  -Voiding, tolerating PO, bowel function nml   -Heme: Hgb 13.2 --> 11   -Advance care as tolerated   -Continue routine postpartum/postoperative care and education  -Healthy male infant, circumcision complete.  -Dispo: Continue inpatient management A/P:  43y  now POD#2 s/p repeat  section at 39w0d gestation, uncomplicated.   -Vital Signs Stable  -Voiding, tolerating PO, bowel function nml   -Heme: Hgb 13.2 --> 9.6, continue PO iron, patient is asymptomatic  -Advance care as tolerated   -Continue routine postpartum/postoperative care and education  -Healthy male infant, circumcision complete.  -Dispo: Discharge instructions reviewed.

## 2023-09-08 NOTE — PROGRESS NOTE ADULT - ATTENDING COMMENTS
43y  now POD#2 s/p repeat  section at 39w0d gestation, uncomplicated.
resident note reviewed  patient doing well  ambulating without difficulty  VS and labs reviewed                        9.6    13.48 )-----------( 157      ( 07 Sep 2023 06:20 )             28.2   POd#1 s/p repeat csection - stable, afebrile  encourage ambulation  male infant - circumcision completed  routine postop care

## 2023-09-13 ENCOUNTER — NON-APPOINTMENT (OUTPATIENT)
Age: 43
End: 2023-09-13

## 2023-09-13 ENCOUNTER — INPATIENT (INPATIENT)
Facility: HOSPITAL | Age: 43
LOS: 1 days | Discharge: HOME | DRG: 776 | End: 2023-09-15
Payer: COMMERCIAL

## 2023-09-13 ENCOUNTER — APPOINTMENT (OUTPATIENT)
Dept: OBGYN | Facility: CLINIC | Age: 43
End: 2023-09-13

## 2023-09-13 VITALS
RESPIRATION RATE: 17 BRPM | TEMPERATURE: 98 F | SYSTOLIC BLOOD PRESSURE: 198 MMHG | HEART RATE: 66 BPM | DIASTOLIC BLOOD PRESSURE: 97 MMHG

## 2023-09-13 DIAGNOSIS — O26.899 OTHER SPECIFIED PREGNANCY RELATED CONDITIONS, UNSPECIFIED TRIMESTER: ICD-10-CM

## 2023-09-13 DIAGNOSIS — Z98.890 OTHER SPECIFIED POSTPROCEDURAL STATES: Chronic | ICD-10-CM

## 2023-09-13 DIAGNOSIS — Z98.891 HISTORY OF UTERINE SCAR FROM PREVIOUS SURGERY: Chronic | ICD-10-CM

## 2023-09-13 LAB
ALBUMIN SERPL ELPH-MCNC: 3.7 G/DL — SIGNIFICANT CHANGE UP (ref 3.3–5.2)
ALP SERPL-CCNC: 136 U/L — HIGH (ref 40–120)
ALT FLD-CCNC: 22 U/L — SIGNIFICANT CHANGE UP
ANION GAP SERPL CALC-SCNC: 14 MMOL/L — SIGNIFICANT CHANGE UP (ref 5–17)
APTT BLD: 33.7 SEC — SIGNIFICANT CHANGE UP (ref 24.5–35.6)
AST SERPL-CCNC: 22 U/L — SIGNIFICANT CHANGE UP
BASOPHILS # BLD AUTO: 0 K/UL — SIGNIFICANT CHANGE UP (ref 0–0.2)
BASOPHILS NFR BLD AUTO: 0 % — SIGNIFICANT CHANGE UP (ref 0–2)
BILIRUB SERPL-MCNC: 0.3 MG/DL — LOW (ref 0.4–2)
BUN SERPL-MCNC: 19.3 MG/DL — SIGNIFICANT CHANGE UP (ref 8–20)
CALCIUM SERPL-MCNC: 9.1 MG/DL — SIGNIFICANT CHANGE UP (ref 8.4–10.5)
CHLORIDE SERPL-SCNC: 101 MMOL/L — SIGNIFICANT CHANGE UP (ref 96–108)
CO2 SERPL-SCNC: 23 MMOL/L — SIGNIFICANT CHANGE UP (ref 22–29)
CREAT SERPL-MCNC: 0.63 MG/DL — SIGNIFICANT CHANGE UP (ref 0.5–1.3)
EGFR: 114 ML/MIN/1.73M2 — SIGNIFICANT CHANGE UP
EOSINOPHIL # BLD AUTO: 0.43 K/UL — SIGNIFICANT CHANGE UP (ref 0–0.5)
EOSINOPHIL NFR BLD AUTO: 3.4 % — SIGNIFICANT CHANGE UP (ref 0–6)
FIBRINOGEN PPP-MCNC: 482 MG/DL — HIGH (ref 200–450)
GIANT PLATELETS BLD QL SMEAR: PRESENT — SIGNIFICANT CHANGE UP
GLUCOSE SERPL-MCNC: 81 MG/DL — SIGNIFICANT CHANGE UP (ref 70–99)
HCT VFR BLD CALC: 33.7 % — LOW (ref 34.5–45)
HGB BLD-MCNC: 11.4 G/DL — LOW (ref 11.5–15.5)
INR BLD: 0.94 RATIO — SIGNIFICANT CHANGE UP (ref 0.85–1.18)
LDH SERPL L TO P-CCNC: 338 U/L — HIGH (ref 98–192)
LYMPHOCYTES # BLD AUTO: 16.4 % — SIGNIFICANT CHANGE UP (ref 13–44)
LYMPHOCYTES # BLD AUTO: 2.09 K/UL — SIGNIFICANT CHANGE UP (ref 1–3.3)
MAGNESIUM SERPL-MCNC: 5.4 MG/DL — HIGH (ref 1.8–2.6)
MANUAL SMEAR VERIFICATION: SIGNIFICANT CHANGE UP
MCHC RBC-ENTMCNC: 28.8 PG — SIGNIFICANT CHANGE UP (ref 27–34)
MCHC RBC-ENTMCNC: 33.8 GM/DL — SIGNIFICANT CHANGE UP (ref 32–36)
MCV RBC AUTO: 85.1 FL — SIGNIFICANT CHANGE UP (ref 80–100)
METAMYELOCYTES # FLD: 1.7 % — HIGH (ref 0–0)
MONOCYTES # BLD AUTO: 0.33 K/UL — SIGNIFICANT CHANGE UP (ref 0–0.9)
MONOCYTES NFR BLD AUTO: 2.6 % — SIGNIFICANT CHANGE UP (ref 2–14)
MYELOCYTES NFR BLD: 0.9 % — HIGH (ref 0–0)
NEUTROPHILS # BLD AUTO: 9.56 K/UL — HIGH (ref 1.8–7.4)
NEUTROPHILS NFR BLD AUTO: 75 % — SIGNIFICANT CHANGE UP (ref 43–77)
PLAT MORPH BLD: NORMAL — SIGNIFICANT CHANGE UP
PLATELET # BLD AUTO: 266 K/UL — SIGNIFICANT CHANGE UP (ref 150–400)
POLYCHROMASIA BLD QL SMEAR: SLIGHT — SIGNIFICANT CHANGE UP
POTASSIUM SERPL-MCNC: 4 MMOL/L — SIGNIFICANT CHANGE UP (ref 3.5–5.3)
POTASSIUM SERPL-SCNC: 4 MMOL/L — SIGNIFICANT CHANGE UP (ref 3.5–5.3)
PROT SERPL-MCNC: 7.1 G/DL — SIGNIFICANT CHANGE UP (ref 6.6–8.7)
PROTHROM AB SERPL-ACNC: 10.4 SEC — SIGNIFICANT CHANGE UP (ref 9.5–13)
RBC # BLD: 3.96 M/UL — SIGNIFICANT CHANGE UP (ref 3.8–5.2)
RBC # FLD: 14 % — SIGNIFICANT CHANGE UP (ref 10.3–14.5)
RBC BLD AUTO: ABNORMAL
SMUDGE CELLS # BLD: PRESENT — SIGNIFICANT CHANGE UP
SODIUM SERPL-SCNC: 138 MMOL/L — SIGNIFICANT CHANGE UP (ref 135–145)
URATE SERPL-MCNC: 3.8 MG/DL — SIGNIFICANT CHANGE UP (ref 2.4–5.7)
WBC # BLD: 12.75 K/UL — HIGH (ref 3.8–10.5)
WBC # FLD AUTO: 12.75 K/UL — HIGH (ref 3.8–10.5)

## 2023-09-13 PROCEDURE — 99222 1ST HOSP IP/OBS MODERATE 55: CPT

## 2023-09-13 RX ORDER — ACETAMINOPHEN 500 MG
975 TABLET ORAL ONCE
Refills: 0 | Status: COMPLETED | OUTPATIENT
Start: 2023-09-13 | End: 2023-09-13

## 2023-09-13 RX ORDER — MAGNESIUM SULFATE 500 MG/ML
4 VIAL (ML) INJECTION ONCE
Refills: 0 | Status: COMPLETED | OUTPATIENT
Start: 2023-09-13 | End: 2023-09-13

## 2023-09-13 RX ORDER — MAGNESIUM SULFATE 500 MG/ML
2 VIAL (ML) INJECTION
Qty: 40 | Refills: 0 | Status: DISCONTINUED | OUTPATIENT
Start: 2023-09-13 | End: 2023-09-14

## 2023-09-13 RX ORDER — HYDRALAZINE HCL 50 MG
10 TABLET ORAL ONCE
Refills: 0 | Status: DISCONTINUED | OUTPATIENT
Start: 2023-09-13 | End: 2023-09-15

## 2023-09-13 RX ORDER — HYDRALAZINE HCL 50 MG
5 TABLET ORAL ONCE
Refills: 0 | Status: DISCONTINUED | OUTPATIENT
Start: 2023-09-13 | End: 2023-09-13

## 2023-09-13 RX ORDER — NIFEDIPINE 30 MG
10 TABLET, EXTENDED RELEASE 24 HR ORAL ONCE
Refills: 0 | Status: COMPLETED | OUTPATIENT
Start: 2023-09-13 | End: 2023-09-13

## 2023-09-13 RX ORDER — SODIUM CHLORIDE 9 MG/ML
1000 INJECTION, SOLUTION INTRAVENOUS
Refills: 0 | Status: DISCONTINUED | OUTPATIENT
Start: 2023-09-13 | End: 2023-09-15

## 2023-09-13 RX ORDER — NIFEDIPINE 30 MG
30 TABLET, EXTENDED RELEASE 24 HR ORAL EVERY 24 HOURS
Refills: 0 | Status: DISCONTINUED | OUTPATIENT
Start: 2023-09-13 | End: 2023-09-15

## 2023-09-13 RX ORDER — LABETALOL HCL 100 MG
20 TABLET ORAL ONCE
Refills: 0 | Status: COMPLETED | OUTPATIENT
Start: 2023-09-13 | End: 2023-09-13

## 2023-09-13 RX ORDER — ACETAMINOPHEN 500 MG
975 TABLET ORAL EVERY 6 HOURS
Refills: 0 | Status: DISCONTINUED | OUTPATIENT
Start: 2023-09-13 | End: 2023-09-15

## 2023-09-13 RX ADMIN — Medication 975 MILLIGRAM(S): at 17:09

## 2023-09-13 RX ADMIN — Medication 10 MILLIGRAM(S): at 17:09

## 2023-09-13 RX ADMIN — Medication 20 MILLIGRAM(S): at 14:09

## 2023-09-13 RX ADMIN — SODIUM CHLORIDE 75 MILLILITER(S): 9 INJECTION, SOLUTION INTRAVENOUS at 18:15

## 2023-09-13 RX ADMIN — Medication 30 MILLIGRAM(S): at 15:41

## 2023-09-13 RX ADMIN — Medication 50 GM/HR: at 13:34

## 2023-09-13 RX ADMIN — Medication 975 MILLIGRAM(S): at 23:50

## 2023-09-13 RX ADMIN — Medication 975 MILLIGRAM(S): at 18:14

## 2023-09-13 RX ADMIN — Medication 300 GRAM(S): at 13:14

## 2023-09-13 NOTE — CONSULT NOTE ADULT - SUBJECTIVE AND OBJECTIVE BOX
[id number] G4 now  s/p repeat  section POD#7  presenting due to severe range BPs at home, noted after taking her blood pressures after finding that she had new onset calf swelling. She also reports mild headache at 9am that resolved spontaneously.  Denies changes in vision, current headache, RUQ pain.    RACHEL: 23   LMP: 22     Pregnancy course:   -Partial previa   -AMA   -Hep B surface antigen carrier    -Hx preeclampsia prior pregnancy - reports was taking nifedipine and one other medication but cannot remember    Obhx:   G1: pC/S (), breech, c/b PreEwSF   G2: D&E @13w GA for conjoined twins ()   SAB x1     Gynhx: -fibroids/-cysts; denies STI hx, denies abnormal paps   Pmhx: Anemia, hx pre-eclampsia   Pshx: prior C/S x1, D&E   Meds: PNV, ASA, oral iron   Allergies: NKDA   Social Hx: denies tobacco or alcohol use       Vital Signs Last 24 Hrs  see manual chart in room; patient with severe range BPs; will chart a hypertensive summary appropriately at a later time      Gen: no acute distress  Abd: nontender, pfannenstiel well-healing  Ext: no calf tenderness; +2 swelling, nonpitting     42yo G4 now  s/p repeat  section POD#7  presenting due to severe range BPs at home, noted after taking her blood pressures after finding that she had new onset calf swelling. She also reports mild headache at 9am that resolved spontaneously.  Denies changes in vision, current headache, RUQ pain.    RACHEL: 23   LMP: 22     Pregnancy course:   -Partial previa   -AMA   -Hep B surface antigen carrier    -Hx preeclampsia prior pregnancy - reports was taking nifedipine and one other medication but cannot remember    Obhx:   G1: pC/S (), breech, c/b PrewSF   G2: D&E @13w GA for conjoined twins ()   SAB x1     Gynhx: -fibroids/-cysts; denies STI hx, denies abnormal paps   Pmhx: Anemia, hx pre-eclampsia   Pshx: prior C/S x1, D&E   Meds: PNV, ASA, oral iron   Allergies: NKDA   Social Hx: denies tobacco or alcohol use       Vital Signs Last 24 Hrs  see manual chart in room; patient with severe range BPs; will chart a hypertensive summary appropriately at a later time      Gen: no acute distress  Abd: nontender, pfannenstiel well-healing  Ext: no calf tenderness; +2 swelling, nonpitting

## 2023-09-13 NOTE — H&P ADULT - ASSESSMENT
44yo G4 now  s/p repeat  section POD#7  admitted for postpartum preeclampsia requiring magnesium sulfate therapy      discussed with attending Dr Garcia 42yo G4 now  s/p repeat  section POD#7  admitted for postpartum preeclampsia requiring magnesium sulfate therapy  - Continue to treat severe range BP  - PIH labs pending  - Magnesium started 1300 , will continue for 24h    discussed with attending Dr Garcia

## 2023-09-13 NOTE — CONSULT NOTE ADULT - PROBLEM SELECTOR RECOMMENDATION 9
patient with severe range BPs  magnesium sulfate therapy initiated  received 20 IVP of hydralazine, continuing to cycle BPs  with bilateral lower extremity edema, nonpitting  physical exam within normal limits  will likely start on PO antihypertensives  preeclamptic labs sent, pending

## 2023-09-13 NOTE — PATIENT PROFILE ADULT - NSPROHMSYMPCOND_GEN_A_NUR
Patient is here alone today.    Patient is requesting a work excuse.    If any information or results need to be relayed from today's visit, the best way to contact the patient is via [phone] (mobile) - Patient gives verbal permission to leave a detailed voicemail at the number provided.       none

## 2023-09-13 NOTE — CONSULT NOTE ADULT - PROBLEM SELECTOR RECOMMENDATION 2
routine postpartum care  pt breastfeeding - pump PRN  SCDs; consider chemoppx if patient for extended stay

## 2023-09-13 NOTE — CONSULT NOTE ADULT - ASSESSMENT
42yo G4 now  s/p repeat  section POD#7  admitted for postpartum preeclampsia requiring magnesium sulfate therapy      discussed with attending Dr Vázquez

## 2023-09-13 NOTE — H&P ADULT - HISTORY OF PRESENT ILLNESS
42yo G4 now  s/p repeat  section POD#7  presenting due to severe range BPs at home.  Associated with calf swelling  She reports mild headache at 9am that resolved spontaneously  Denies changes in vision, headache, RUQ pain    RACHEL: 23   LMP: 22     Pregnancy course:   -Partial previa   -AMA   -Hep B surface antigen carrier    -Hx preeclampsia prior pregnancy     Obhx:   G1: pC/S (), breech, c/b PreEwSF   G2: D&E @13w GA for conjoined twins ()   SAB x1     Gynhx: -fibroids/-cysts; denies STI hx, denies abnormal paps   Pmhx: Anemia, hx pre-eclampsia   Pshx: prior C/S x1, D&E   Meds: PNV, ASA, oral iron   Allergies: NKDA   Social Hx: denies tobacco or alcohol use    44yo G4 now  s/p repeat  section POD#7  presenting due to severe range BPs at home.  Reported shortness of breath last night that resolved spontaneously overnight. Denies SOB currently.  Associated with calf swelling  She reports mild headache at 9am that resolved spontaneously  Denies current headache, vision changes, RUQ pain, SOB.   Feels well currently. No other complaints.     RACHEL: 23   LMP: 22     Pregnancy course:   -Partial previa   -AMA   -Hep B surface antigen carrier    -Hx preeclampsia prior pregnancy     Obhx:   G1: pC/S (), breech, c/b PreEwSF   G2: D&E @13w GA for conjoined twins ()   SAB x1     Gynhx: -fibroids/-cysts; denies STI hx, denies abnormal paps   Pmhx: None  Pshx: prior C/S x1, D&E   Meds: PNV, ASA, oral iron   Allergies: NKDA   Social Hx: denies tobacco or alcohol use

## 2023-09-13 NOTE — CONSULT NOTE ADULT - ATTENDING COMMENTS
MFM - Consultation requested for this 43y.o.  who is POD#7 after repeat CD who presents for evaluation of LE swelling and elevated BP at home.  Patient reports history of postpartum preeclampsia in prior pregnancy and took home BP after she noted mild headache and LE swelling at home.  In triage, had multiple severe range BP requiring IV antihypertensive therapy. MGSO4 initiated for seizure prophylaxis. She currently reports no symptoms of preeclampsia.  Laboratory evaluation unremarkable.  PE normal without noted LE edema or calf pain.  Patient advised of diagnosis of postpartum preeclampsia.  Recommend MGSO4 for 24 hours and start Procardia XL 30 mg daily.  Trend BP and lab.  Titrate Procardia to maintain BP <140/90. We reviewed increased risk of recurrence in pregnancy and increased lifetime risk of CVD.  Will need outpatient follow up with CardioOB.  Patient voiced understanding and all questions answered.   Rosette Vázquez MD  Maternal Fetal Medicine

## 2023-09-13 NOTE — H&P ADULT - NSHPPHYSICALEXAM_GEN_ALL_CORE
Vital Signs Last 24 Hrs    see manual flowsheet; severe range BPs, HR 66        Gen: no acute distress  Abd: nontender  Ext: no calf tenderness; +2 swelling Vital Signs Last 24 Hrs    see manual flowsheet; severe range BPs, HR 66        Gen: no acute distress  Pulm: No rales on auscultation  Abd: nontender  Ext: no calf tenderness; +1 bilateral pitting edema to mid calves

## 2023-09-14 ENCOUNTER — APPOINTMENT (OUTPATIENT)
Dept: ANTEPARTUM | Facility: CLINIC | Age: 43
End: 2023-09-14

## 2023-09-14 LAB
ALBUMIN SERPL ELPH-MCNC: 3.9 G/DL — SIGNIFICANT CHANGE UP (ref 3.3–5.2)
ALP SERPL-CCNC: 115 U/L — SIGNIFICANT CHANGE UP (ref 40–120)
ALT FLD-CCNC: 22 U/L — SIGNIFICANT CHANGE UP
ANION GAP SERPL CALC-SCNC: 13 MMOL/L — SIGNIFICANT CHANGE UP (ref 5–17)
APTT BLD: 34.6 SEC — SIGNIFICANT CHANGE UP (ref 24.5–35.6)
AST SERPL-CCNC: 21 U/L — SIGNIFICANT CHANGE UP
BASOPHILS # BLD AUTO: 0.07 K/UL — SIGNIFICANT CHANGE UP (ref 0–0.2)
BASOPHILS NFR BLD AUTO: 0.5 % — SIGNIFICANT CHANGE UP (ref 0–2)
BILIRUB SERPL-MCNC: 0.3 MG/DL — LOW (ref 0.4–2)
BUN SERPL-MCNC: 12.9 MG/DL — SIGNIFICANT CHANGE UP (ref 8–20)
CALCIUM SERPL-MCNC: 7.3 MG/DL — LOW (ref 8.4–10.5)
CHLORIDE SERPL-SCNC: 100 MMOL/L — SIGNIFICANT CHANGE UP (ref 96–108)
CO2 SERPL-SCNC: 24 MMOL/L — SIGNIFICANT CHANGE UP (ref 22–29)
CREAT SERPL-MCNC: 0.59 MG/DL — SIGNIFICANT CHANGE UP (ref 0.5–1.3)
EGFR: 116 ML/MIN/1.73M2 — SIGNIFICANT CHANGE UP
EOSINOPHIL # BLD AUTO: 0.23 K/UL — SIGNIFICANT CHANGE UP (ref 0–0.5)
EOSINOPHIL NFR BLD AUTO: 1.8 % — SIGNIFICANT CHANGE UP (ref 0–6)
FIBRINOGEN PPP-MCNC: 527 MG/DL — HIGH (ref 200–450)
GLUCOSE SERPL-MCNC: 102 MG/DL — HIGH (ref 70–99)
HCT VFR BLD CALC: 38 % — SIGNIFICANT CHANGE UP (ref 34.5–45)
HGB BLD-MCNC: 12.8 G/DL — SIGNIFICANT CHANGE UP (ref 11.5–15.5)
IMM GRANULOCYTES NFR BLD AUTO: 2.4 % — HIGH (ref 0–0.9)
INR BLD: 0.93 RATIO — SIGNIFICANT CHANGE UP (ref 0.85–1.18)
LDH SERPL L TO P-CCNC: 270 U/L — HIGH (ref 98–192)
LYMPHOCYTES # BLD AUTO: 18.3 % — SIGNIFICANT CHANGE UP (ref 13–44)
LYMPHOCYTES # BLD AUTO: 2.34 K/UL — SIGNIFICANT CHANGE UP (ref 1–3.3)
MAGNESIUM SERPL-MCNC: 5.8 MG/DL — HIGH (ref 1.6–2.6)
MAGNESIUM SERPL-MCNC: 6.4 MG/DL — HIGH (ref 1.6–2.6)
MCHC RBC-ENTMCNC: 28.5 PG — SIGNIFICANT CHANGE UP (ref 27–34)
MCHC RBC-ENTMCNC: 33.7 GM/DL — SIGNIFICANT CHANGE UP (ref 32–36)
MCV RBC AUTO: 84.6 FL — SIGNIFICANT CHANGE UP (ref 80–100)
MONOCYTES # BLD AUTO: 0.84 K/UL — SIGNIFICANT CHANGE UP (ref 0–0.9)
MONOCYTES NFR BLD AUTO: 6.6 % — SIGNIFICANT CHANGE UP (ref 2–14)
NEUTROPHILS # BLD AUTO: 8.98 K/UL — HIGH (ref 1.8–7.4)
NEUTROPHILS NFR BLD AUTO: 70.4 % — SIGNIFICANT CHANGE UP (ref 43–77)
PLATELET # BLD AUTO: 338 K/UL — SIGNIFICANT CHANGE UP (ref 150–400)
POTASSIUM SERPL-MCNC: 3.9 MMOL/L — SIGNIFICANT CHANGE UP (ref 3.5–5.3)
POTASSIUM SERPL-SCNC: 3.9 MMOL/L — SIGNIFICANT CHANGE UP (ref 3.5–5.3)
PROT SERPL-MCNC: 7.3 G/DL — SIGNIFICANT CHANGE UP (ref 6.6–8.7)
PROTHROM AB SERPL-ACNC: 10.3 SEC — SIGNIFICANT CHANGE UP (ref 9.5–13)
RBC # BLD: 4.49 M/UL — SIGNIFICANT CHANGE UP (ref 3.8–5.2)
RBC # FLD: 14.3 % — SIGNIFICANT CHANGE UP (ref 10.3–14.5)
SODIUM SERPL-SCNC: 137 MMOL/L — SIGNIFICANT CHANGE UP (ref 135–145)
URATE SERPL-MCNC: 4.2 MG/DL — SIGNIFICANT CHANGE UP (ref 2.4–5.7)
WBC # BLD: 12.76 K/UL — HIGH (ref 3.8–10.5)
WBC # FLD AUTO: 12.76 K/UL — HIGH (ref 3.8–10.5)

## 2023-09-14 PROCEDURE — 80053 COMPREHEN METABOLIC PANEL: CPT

## 2023-09-14 PROCEDURE — 83615 LACTATE (LD) (LDH) ENZYME: CPT

## 2023-09-14 PROCEDURE — 84550 ASSAY OF BLOOD/URIC ACID: CPT

## 2023-09-14 PROCEDURE — 99232 SBSQ HOSP IP/OBS MODERATE 35: CPT | Mod: GC

## 2023-09-14 PROCEDURE — 83735 ASSAY OF MAGNESIUM: CPT

## 2023-09-14 PROCEDURE — 36415 COLL VENOUS BLD VENIPUNCTURE: CPT

## 2023-09-14 PROCEDURE — 85730 THROMBOPLASTIN TIME PARTIAL: CPT

## 2023-09-14 PROCEDURE — 85384 FIBRINOGEN ACTIVITY: CPT

## 2023-09-14 PROCEDURE — 85025 COMPLETE CBC W/AUTO DIFF WBC: CPT

## 2023-09-14 PROCEDURE — 85610 PROTHROMBIN TIME: CPT

## 2023-09-14 RX ORDER — IBUPROFEN 200 MG
600 TABLET ORAL EVERY 6 HOURS
Refills: 0 | Status: DISCONTINUED | OUTPATIENT
Start: 2023-09-14 | End: 2023-09-15

## 2023-09-14 RX ADMIN — Medication 975 MILLIGRAM(S): at 00:30

## 2023-09-14 RX ADMIN — Medication 30 MILLIGRAM(S): at 15:42

## 2023-09-14 RX ADMIN — Medication 50 GM/HR: at 09:46

## 2023-09-14 RX ADMIN — Medication 975 MILLIGRAM(S): at 12:58

## 2023-09-14 RX ADMIN — Medication 975 MILLIGRAM(S): at 13:28

## 2023-09-14 NOTE — PROGRESS NOTE ADULT - ASSESSMENT
A/P:   44yo G4 now  s/p repeat  section POD#8  admitted for postpartum preeclampsia.       discussed with attending Dr Vázquez

## 2023-09-14 NOTE — PROGRESS NOTE ADULT - PROBLEM SELECTOR PLAN 1
patient with severe range BPs  magnesium sulfate therapy initiated  First 24 hrs: received 20 IVP of hydralazine, 20 IVP labetalol, and 10 procardia IR  started on PO procardia given approximately at 1530 on 9/14  continuing to monitor BPs  physical exam within normal limits  preeclamptic labs without abnormalities, being trended alongside magnesium levels  will need cardio OB referral outpatient  has BP cuff at home

## 2023-09-14 NOTE — PROGRESS NOTE ADULT - NSPROGADDITIONALINFOA_GEN_ALL_CORE
MFM Fellow addendum    Pt seen and evaluated PPD8 s/p rltcs now admitted with preE with SF dt severe range BP. Sp hydralazine 20mg, started on nifedipine 30XL, and given nifedipine 10 IR once dose until XL medication could take effect. BP will controlled by not populating into sunrise this am. normotensive at this time. Recommend continuing Magneisum for total of 24 hours of therapy. After discontinuation monitor BP for at least 12h. Labs appropriate this am, and last night. Mag levels per protocol, no further CMP or CBC indicated at this time unless clinical status changes.    Jefry Fritz DO   MFM Fellow

## 2023-09-15 ENCOUNTER — TRANSCRIPTION ENCOUNTER (OUTPATIENT)
Age: 43
End: 2023-09-15

## 2023-09-15 VITALS
DIASTOLIC BLOOD PRESSURE: 78 MMHG | TEMPERATURE: 98 F | SYSTOLIC BLOOD PRESSURE: 136 MMHG | OXYGEN SATURATION: 97 % | RESPIRATION RATE: 18 BRPM | HEART RATE: 79 BPM

## 2023-09-15 PROCEDURE — 99238 HOSP IP/OBS DSCHRG MGMT 30/<: CPT | Mod: GC

## 2023-09-15 RX ORDER — NIFEDIPINE 30 MG
1 TABLET, EXTENDED RELEASE 24 HR ORAL
Qty: 28 | Refills: 3
Start: 2023-09-15 | End: 2024-01-04

## 2023-09-15 RX ORDER — NIFEDIPINE 30 MG
1 TABLET, EXTENDED RELEASE 24 HR ORAL
Qty: 60 | Refills: 1
Start: 2023-09-15

## 2023-09-15 NOTE — DISCHARGE NOTE OB - PATIENT PORTAL LINK FT
You can access the FollowMyHealth Patient Portal offered by  by registering at the following website: [email or web address]. By joining [employer]’s FollowMyHealth portal, you will also be able to view your health information using other applications (apps) compatible with our system.

## 2023-09-15 NOTE — DISCHARGE NOTE OB - CARE PLAN
Scribe Attestation (For Scribes USE Only)... Attending Attestation (For Attendings USE Only).../Scribe Attestation (For Scribes USE Only)... 1 Principal Discharge DX:	Hypertension in pregnancy, preeclampsia, severe, postpartum condition  Assessment and plan of treatment:	- Followup in primary OB office monday  - CardioOB followup postpartum  - Pree Precautions and parameters to call back given to patient. Immediate call for  or above,  or above, and call with BP >140/90 for medication adjustment.

## 2023-09-15 NOTE — PROGRESS NOTE ADULT - ASSESSMENT
A/P:   44yo G4 now  s/p repeat  section POD#8  admitted for postpartum preeclampsia.       discussed with attending Dr Vázquez A/P:   44yo G4 now  s/p repeat  section POD#9  admitted for postpartum preeclampsia.

## 2023-09-15 NOTE — DISCHARGE NOTE OB - HOSPITAL COURSE
Admitted w Pree with SF PP. treated with Mag for 24 hours with no issues. started on Nifedipine 30XL

## 2023-09-15 NOTE — PROGRESS NOTE ADULT - PROBLEM SELECTOR PLAN 1
patient with severe range BPs  magnesium sulfate therapy initiated  First 24 hrs: received 20 IVP of hydralazine, 20 IVP labetalol, and 10 procardia IR  started on PO procardia given approximately at 1530 on 9/14  continuing to monitor BPs  physical exam within normal limits  preeclamptic labs without abnormalities, being trended alongside magnesium levels  will need cardio OB referral outpatient  has BP cuff at home Severe range blood pressures treated and resolved s/p magnesium sulfate therapy  BP well controlled on nifedipine 30XL. Patient assymptomatic and has BP Cuff at home. comfortable and feels safe for discharge

## 2023-09-15 NOTE — DISCHARGE NOTE ANTEPARTUM - CARE PLAN
1 Principal Discharge DX:	Hypertension in pregnancy, preeclampsia, severe, postpartum condition  Assessment and plan of treatment:	-Continue Nifedipine 30XL outpatient at this time  - Followup in primary OB office monday  - CardioOB followup postpartum  - Pree Precautions and parameters to call back given to patient. Immediate call for  or above,  or above, and call with BP >140/90 for medication adjustment.

## 2023-09-15 NOTE — PROGRESS NOTE ADULT - SUBJECTIVE AND OBJECTIVE BOX
44yo G4 now  s/p repeat  section POD#9  admitted for postpartum preeclampsia.     S:    Reports she is doing well Denies changes in vision, RUQ pain.  Incisional pain is controlled with PRN medication   Tolerating regular diet, denies N/V.   voiding with bedpan or after assisted ambulation to bathroom    O:   Vital Signs Last 24 Hrs  T(C): 37 (15 Sep 2023 04:54), Max: 37.5 (15 Sep 2023 00:40)  T(F): 98.6 (15 Sep 2023 04:54), Max: 99.5 (15 Sep 2023 00:40)  HR: 84 (15 Sep 2023 04:54) (73 - 92)  BP: 136/77 (15 Sep 2023 04:54) (124/71 - 148/82)  BP(mean): --  RR: 16 (15 Sep 2023 04:54) (16 - 18)  SpO2: 99% (15 Sep 2023 04:54) (96% - 100%)    Parameters below as of 15 Sep 2023 04:54  Patient On (Oxygen Delivery Method): room air        Gen NAD  CV well perfused  Resp breathing spontaneously with no significant effort  Abd incision CDI          
44yo G4 now  s/p repeat  section POD#8  admitted for postpartum preeclampsia.     S:    Reports mild headache and "fogginess" after starting magnesium sulfate that had improved after tylenol but is still present. .  Denies changes in vision, RUQ pain.  Incisional pain is controlled with PRN medication   Tolerating regular diet, denies N/V.   voiding with bedpan or after assisted ambulation to bathroom    O:   T(C): 36.6 (23 @ 17:01), Max: 36.7 (23 @ 12:29)  HR: 79 (23 @ 07:32) (66 - 90)  BP: 122/68 (23 @ 07:18) (117/60 - 220/105)  RR: 18 (23 @ 13:30) (17 - 18)  SpO2: 100% (23 @ 07:32) (98% - 100%)    Gen: no acute distress  Abd: nontender, pfannenstiel well-healing with steri strips  Ext: no calf tenderness; +2 swelling, nonpitting    23 @ 07:01  -  23 @ 07:00  --------------------------------------------------------  IN: 1675 mL / OUT: 5900 mL / NET: -4225 mL        Labs:

## 2023-09-15 NOTE — DISCHARGE NOTE ANTEPARTUM - PLAN OF CARE
-Continue Nifedipine 30XL outpatient at this time  - Followup in primary OB office monday  - CardioOB followup postpartum  - Pree Precautions and parameters to call back given to patient. Immediate call for  or above,  or above, and call with BP >140/90 for medication adjustment.

## 2023-09-15 NOTE — DISCHARGE NOTE ANTEPARTUM - MEDICATION SUMMARY - MEDICATIONS TO TAKE
I will START or STAY ON the medications listed below when I get home from the hospital:    ibuprofen 600 mg oral tablet  -- 1 tab(s) by mouth every 6 hours  -- Indication: For Single delivery by  section    acetaminophen 325 mg oral tablet  -- 3 tab(s) by mouth every 6 hours as needed for  moderate pain  -- Indication: For Single delivery by  section    NIFEdipine 30 mg oral tablet, extended release  -- 1 tab(s) by mouth every 24 hours  -- Indication: For Eclampsia complicating the puerperium

## 2023-09-15 NOTE — DISCHARGE NOTE OB - PLAN OF CARE
- Followup in primary OB office monday  - CardioOB followup postpartum  - Pree Precautions and parameters to call back given to patient. Immediate call for  or above,  or above, and call with BP >140/90 for medication adjustment.

## 2023-09-15 NOTE — PROGRESS NOTE ADULT - ATTENDING COMMENTS
MFM - HD#2 / POD#8 admitted with postpartum preeclampsia.  No overnight events.  Continues MGSO4 for seizure prophylaxis.  BP have been stable on Procardia XL 30 mg daily.  Laboratory evaluation stable.  Will complete MGSO4 this afternoon.  Evaluate BP response and titrate Procardia as needed to maintain BP <140/90.  Rosette Vázquez MD  Maternal Fetal Medicine
LOR[facility]INEM Attending    43 year old  s/p repeat  section POD#9, admitted for postpartum preeclampsia. Currently denies headache, vision changes, N/V, RUQ/epigastric pain. Blood pressures well controlled on antihypertensive therapy. d/c home. CardioOB referral.    BROOKE Bland

## 2023-09-15 NOTE — DISCHARGE NOTE ANTEPARTUM - PATIENT PORTAL LINK FT
You can access the FollowMyHealth Patient Portal offered by [facility] by registering at the following website: [email or web address]. By joining [facility]’s FollowMyHealth portal, you will also be able to view your health information using other applications (apps) compatible with our system.

## 2023-09-15 NOTE — PROGRESS NOTE ADULT - PROBLEM SELECTOR PLAN 2
routine postpartum care  pt breastfeeding - pump PRN  SCDs; consider chemoppx if patient for extended stay.
routine postpartum care  pt breastfeeding - pump PRN  SCDs; consider chemoppx if patient for extended stay.

## 2023-09-15 NOTE — DISCHARGE NOTE OB - NS MD DC FALL RISK RISK
For information on Fall & Injury Prevention, visit: [email or web address] OR  [email or web address] OR  https://www.cdc.gov/steadi/patient.html

## 2023-09-15 NOTE — DISCHARGE NOTE OB - MEDICATION SUMMARY - MEDICATIONS TO TAKE
I will START or STAY ON the medications listed below when I get home from the hospital:    ibuprofen 600 mg oral tablet  -- 1 tab(s) by mouth every 6 hours  -- Indication: For Single delivery by  section    acetaminophen 325 mg oral tablet  -- 3 tab(s) by mouth every 6 hours as needed for  moderate pain  -- Indication: For Single delivery by  section    NIFEdipine 30 mg oral tablet, extended release  -- 1 tab(s) by mouth every 24 hours  -- Indication: For Hypertension in pregnancy, preeclampsia, severe, postpartum condition

## 2023-09-15 NOTE — DISCHARGE NOTE OB - EAT A WELL BALANCED DIET INCLUDING PROTEINS (LEAN MEATS, POULTRY, FISH AND BEANS), FRESH FRUIT OR JUICE, FRESH VEGETABLES, AND DAIRY PRODUCTS
Pt rings call bell. EDRN responds and asks what we can do for her. Pt looks at EDRN and yells \"you cant help me! Get out!\" EDRN request pt to adonis. Pt yells \"get out\". Conversation passed along to NW and EDPA.    Statement Selected

## 2023-09-15 NOTE — DISCHARGE NOTE ANTEPARTUM - HOSPITAL COURSE
Patient admitted with pree w SF postpartum. treated with MagSO4 for 24 hours and nifedipine 30XL. stable on POD9

## 2023-09-15 NOTE — DISCHARGE NOTE ANTEPARTUM - CARE PROVIDER_API CALL
Manuel James  Obstetrics and Gynecology  [address] [facility], [address]  [place] [address]  Phone: [phone]  Fax: [phone]  Follow Up Time:

## 2023-09-15 NOTE — DISCHARGE NOTE OB - CARE PROVIDER_API CALL
Manuel James  Obstetrics and Gynecology  [address], [address]  [place] [address]  Phone: [phone]  Fax: [phone]  Follow Up Time:

## 2023-09-18 ENCOUNTER — APPOINTMENT (OUTPATIENT)
Dept: OBGYN | Facility: CLINIC | Age: 43
End: 2023-09-18
Payer: COMMERCIAL

## 2023-09-18 VITALS
SYSTOLIC BLOOD PRESSURE: 150 MMHG | BODY MASS INDEX: 28.88 KG/M2 | HEIGHT: 63 IN | WEIGHT: 163 LBS | DIASTOLIC BLOOD PRESSURE: 84 MMHG

## 2023-09-18 DIAGNOSIS — O09.291 SUPERVISION OF PREGNANCY WITH OTHER POOR REPRODUCTIVE OR OBSTETRIC HISTORY, FIRST TRIMESTER: ICD-10-CM

## 2023-09-18 DIAGNOSIS — O34.219 MATERNAL CARE FOR UNSPECIFIED TYPE SCAR FROM PREVIOUS CESAREAN DELIVERY: ICD-10-CM

## 2023-09-18 DIAGNOSIS — Z87.2 PERSONAL HISTORY OF DISEASES OF THE SKIN AND SUBCUTANEOUS TISSUE: ICD-10-CM

## 2023-09-18 DIAGNOSIS — O09.529 SUPERVISION OF ELDERLY MULTIGRAVIDA, UNSPECIFIED TRIMESTER: ICD-10-CM

## 2023-09-18 DIAGNOSIS — Z34.93 ENCOUNTER FOR SUPERVISION OF NORMAL PREGNANCY, UNSPECIFIED, THIRD TRIMESTER: ICD-10-CM

## 2023-09-18 DIAGNOSIS — O09.899 SUPERVISION OF OTHER HIGH RISK PREGNANCIES, UNSPECIFIED TRIMESTER: ICD-10-CM

## 2023-09-18 DIAGNOSIS — Z87.898 PERSONAL HISTORY OF OTHER SPECIFIED CONDITIONS: ICD-10-CM

## 2023-09-18 PROCEDURE — 99214 OFFICE O/P EST MOD 30 MIN: CPT

## 2023-09-24 PROBLEM — O09.899 SHORT INTERVAL BETWEEN PREGNANCIES AFFECTING PREGNANCY, ANTEPARTUM: Status: RESOLVED | Noted: 2022-03-07 | Resolved: 2023-09-24

## 2023-09-24 PROBLEM — Z34.93 THIRD TRIMESTER PREGNANCY: Status: RESOLVED | Noted: 2023-07-10 | Resolved: 2023-09-24

## 2023-09-24 PROBLEM — Z87.2 HISTORY OF ABSCESS OF SKIN AND SUBCUTANEOUS TISSUE: Status: RESOLVED | Noted: 2023-02-26 | Resolved: 2023-09-24

## 2023-09-24 PROBLEM — Z87.898 HISTORY OF IMPAIRED GLUCOSE TOLERANCE: Status: RESOLVED | Noted: 2023-06-08 | Resolved: 2023-09-24

## 2023-09-24 PROBLEM — O09.291 HISTORY OF PRE-ECLAMPSIA IN PRIOR PREGNANCY, CURRENTLY PREGNANT, FIRST TRIMESTER: Status: RESOLVED | Noted: 2022-03-09 | Resolved: 2023-09-24

## 2023-09-24 PROBLEM — O09.529 AMA (ADVANCED MATERNAL AGE) MULTIGRAVIDA 35+: Status: RESOLVED | Noted: 2022-03-07 | Resolved: 2023-09-24

## 2023-09-24 PROBLEM — O34.219 HISTORY OF CESAREAN DELIVERY, CURRENTLY PREGNANT: Status: RESOLVED | Noted: 2022-03-07 | Resolved: 2023-09-24

## 2023-10-17 ENCOUNTER — APPOINTMENT (OUTPATIENT)
Dept: OBGYN | Facility: CLINIC | Age: 43
End: 2023-10-17
Payer: COMMERCIAL

## 2023-10-17 VITALS
SYSTOLIC BLOOD PRESSURE: 137 MMHG | BODY MASS INDEX: 28.17 KG/M2 | WEIGHT: 159 LBS | HEIGHT: 63 IN | DIASTOLIC BLOOD PRESSURE: 84 MMHG

## 2023-10-17 DIAGNOSIS — Z01.30 ENCOUNTER FOR EXAMINATION OF BLOOD PRESSURE W/OUT ABNORMAL FINDINGS: ICD-10-CM

## 2023-10-17 PROCEDURE — 0503F POSTPARTUM CARE VISIT: CPT

## 2023-10-19 ENCOUNTER — NON-APPOINTMENT (OUTPATIENT)
Age: 43
End: 2023-10-19

## 2023-10-19 ENCOUNTER — APPOINTMENT (OUTPATIENT)
Dept: CARDIOLOGY | Facility: CLINIC | Age: 43
End: 2023-10-19
Payer: COMMERCIAL

## 2023-10-19 VITALS — DIASTOLIC BLOOD PRESSURE: 72 MMHG | SYSTOLIC BLOOD PRESSURE: 122 MMHG

## 2023-10-19 VITALS
HEART RATE: 68 BPM | DIASTOLIC BLOOD PRESSURE: 68 MMHG | SYSTOLIC BLOOD PRESSURE: 120 MMHG | OXYGEN SATURATION: 99 % | HEIGHT: 63 IN | BODY MASS INDEX: 27.64 KG/M2 | WEIGHT: 156 LBS

## 2023-10-19 PROCEDURE — 93000 ELECTROCARDIOGRAM COMPLETE: CPT

## 2023-10-19 PROCEDURE — 99203 OFFICE O/P NEW LOW 30 MIN: CPT

## 2023-10-19 RX ORDER — IBUPROFEN 600 MG/1
600 TABLET, FILM COATED ORAL 4 TIMES DAILY
Qty: 60 | Refills: 0 | Status: DISCONTINUED | COMMUNITY
Start: 2022-03-14 | End: 2023-10-19

## 2023-10-19 RX ORDER — MIFEPRISTONE 200 MG
200 TABLET ORAL
Refills: 0 | Status: DISCONTINUED | COMMUNITY
Start: 2022-03-14 | End: 2023-10-19

## 2023-10-19 RX ORDER — ACETAMINOPHEN 500 MG/1
500 TABLET ORAL
Refills: 0 | Status: DISCONTINUED | COMMUNITY
Start: 2021-02-01 | End: 2023-10-19

## 2023-10-19 RX ORDER — ASPIRIN 81 MG/1
81 TABLET, CHEWABLE ORAL
Refills: 0 | Status: DISCONTINUED | COMMUNITY
Start: 2022-03-10 | End: 2023-10-19

## 2023-11-30 ENCOUNTER — APPOINTMENT (OUTPATIENT)
Dept: CARDIOLOGY | Facility: CLINIC | Age: 43
End: 2023-11-30
Payer: COMMERCIAL

## 2023-11-30 VITALS
DIASTOLIC BLOOD PRESSURE: 78 MMHG | BODY MASS INDEX: 27.82 KG/M2 | WEIGHT: 157 LBS | OXYGEN SATURATION: 99 % | HEIGHT: 63 IN | HEART RATE: 72 BPM | SYSTOLIC BLOOD PRESSURE: 122 MMHG

## 2023-11-30 PROCEDURE — 99213 OFFICE O/P EST LOW 20 MIN: CPT

## 2023-12-01 NOTE — DISCHARGE NOTE ANTEPARTUM - REST! DO NOT DO HEAVY HOUSEWORK, LIFTING OR STRENOUS EXERCISE FOR TWO WEEKS
[address], [address] [place] [address], [address]   [id number] [facility]  [address] [facility]  [phone]      Patient:  Ryan Neville  YOB: 1945  Medical Record #:  [id number]   Date:  12/1/2023   Physician:  Wade Chin MD    Procedure Performed: [unfilled]     Discharge Instructions    Notify Pain Management Services if any of the following occur:    Redness/Swelling at the injection site lasting longer than 2 days  Fever (with redness, swelling, or drainage at the injection site)  Drainage at the injection site  New weakness/ numbness  Severe headache   Loss of bowel/ bladder function    General Instructions: You may experience numbness for several hours following your treatment. You should be cautious using those areas which are numb. Once the numbness wears off, you may apply ice or heat to injection site, if needed. Do not return to work or drive today    Rest today and return to normal activities tomorrow. On average, the steroid takes about 1 week to work and can be up to 2 weeks    You should continue to depend on your primary physician for your medical management of conditions not related to your pain management treatment. Continue to take all your other medications, including blood thinners, as directed by your primary physician unless otherwise instructed. NO changes have been made to your medications. Any changes listed on the discharge are based on patient self reporting. Any EMR warnings regarding drug/drug interactions were dismissed based on current use by the patient, management by prescribing physician and pharmacist and not managed by this physician. Any problem which relates specifically to a treatment or procedure performed today should be directed to the [facility].        [id number] [facility]  [id number]
Statement Selected

## 2024-01-04 ENCOUNTER — APPOINTMENT (OUTPATIENT)
Dept: OBGYN | Facility: CLINIC | Age: 44
End: 2024-01-04
Payer: COMMERCIAL

## 2024-01-04 VITALS
HEIGHT: 63 IN | SYSTOLIC BLOOD PRESSURE: 119 MMHG | DIASTOLIC BLOOD PRESSURE: 77 MMHG | BODY MASS INDEX: 28.53 KG/M2 | WEIGHT: 161 LBS

## 2024-01-04 DIAGNOSIS — Z01.419 ENCOUNTER FOR GYNECOLOGICAL EXAMINATION (GENERAL) (ROUTINE) W/OUT ABNORMAL FINDINGS: ICD-10-CM

## 2024-01-04 PROCEDURE — 99396 PREV VISIT EST AGE 40-64: CPT

## 2024-01-04 NOTE — DISCUSSION/SUMMARY
[FreeTextEntry1] : Pap smear is performed. As mentioned patient is continuing her prenatal vitamins.  rt in 1 year

## 2024-01-04 NOTE — HISTORY OF PRESENT ILLNESS
[FreeTextEntry1] : 43-year-old  female -0-1-2 here for annual visit. She has no complaints today.  She is nursing 4 mo old son.Does not wish for birth control.  She has a medical history of asthma and hepatitis in the past. Surgical history one . She is . She denies tobacco drug use but drinks occasionally. She denies family h/o gyn or colon cancers.

## 2024-01-05 LAB — HPV HIGH+LOW RISK DNA PNL CVX: NOT DETECTED

## 2024-01-11 LAB — CYTOLOGY CVX/VAG DOC THIN PREP: NORMAL

## 2024-02-08 ENCOUNTER — APPOINTMENT (OUTPATIENT)
Dept: CARDIOLOGY | Facility: CLINIC | Age: 44
End: 2024-02-08
Payer: COMMERCIAL

## 2024-02-08 ENCOUNTER — NON-APPOINTMENT (OUTPATIENT)
Age: 44
End: 2024-02-08

## 2024-02-08 VITALS
OXYGEN SATURATION: 99 % | SYSTOLIC BLOOD PRESSURE: 100 MMHG | WEIGHT: 156 LBS | BODY MASS INDEX: 27.64 KG/M2 | HEIGHT: 63 IN | DIASTOLIC BLOOD PRESSURE: 72 MMHG | HEART RATE: 64 BPM

## 2024-02-08 PROCEDURE — 99213 OFFICE O/P EST LOW 20 MIN: CPT

## 2024-02-08 PROCEDURE — 93000 ELECTROCARDIOGRAM COMPLETE: CPT

## 2024-02-08 RX ORDER — NIFEDIPINE 30 MG/1
30 TABLET, EXTENDED RELEASE ORAL DAILY
Qty: 90 | Refills: 2 | Status: DISCONTINUED | COMMUNITY
Start: 2023-10-15 | End: 2024-02-08

## 2024-02-08 RX ORDER — NIFEDIPINE 30 MG/1
30 TABLET, FILM COATED, EXTENDED RELEASE ORAL
Qty: 28 | Refills: 0 | Status: DISCONTINUED | COMMUNITY
Start: 2023-09-15 | End: 2024-02-08

## 2024-02-08 RX ORDER — ACETAMINOPHEN 325 MG/1
325 TABLET ORAL
Qty: 30 | Refills: 0 | Status: DISCONTINUED | COMMUNITY
Start: 2023-09-08 | End: 2024-02-08

## 2024-02-08 NOTE — DISCUSSION/SUMMARY
[FreeTextEntry1] : 44 y/o F, , s/p delivery via repeat CS 23 with subsequent readmission 23 due to postpartum PEC. Was discharged on Procardia XL 30mg. BP is now controlled. D/c procardia. Patient advised to monitor ambulatory BP x 1 week and restart if >140/90. Return to clinic 4-6 weeks for BP check. Urine protein/Cr ratio ordered to ensure resolution. Patient counseled on increased risk of PEC with subsequent pregnancies, as well as overall increased risk of cHTN and CAD. Advised to eat a heart healthy diet and aim for 150 mins of moderate intensity exercise/week.   [EKG obtained to assist in diagnosis and management of assessed problem(s)] : EKG obtained to assist in diagnosis and management of assessed problem(s)

## 2024-02-08 NOTE — HISTORY OF PRESENT ILLNESS
[FreeTextEntry1] : 42 y/o F, , s/p delivery via repeat CS 23 with subsequent readmission 23 due to postpartum PEC. Was discharged on Procardia XL 30mg. She reports PEC during her prior pregnancy as well, but states she did not require antihypertensives at that time. She denies any other PMH.   Family Hx: denies premature CAD or sudden cardiac death  Social Hx: former smoker, 1 pack per week x 10 years, quit 5 years ago; occasional ETOH, denies illicit drug use   Patient was last seen by me 23. She was continued on procardia.  Monitors ambulatory BP, states numbers have been well controlled  She currently feels well. Denies HA, blurry vision, CP, dyspnea, palpitations, dizziness, lightheadedness, LE edema.

## 2024-02-09 LAB
CREAT SPEC-SCNC: 176 MG/DL
CREAT/PROT UR: 0.1 RATIO
PROT UR-MCNC: 20 MG/DL

## 2024-03-16 ENCOUNTER — EMERGENCY (EMERGENCY)
Facility: HOSPITAL | Age: 44
LOS: 1 days | Discharge: HOME | End: 2024-03-16
Attending: EMERGENCY MEDICINE
Payer: COMMERCIAL

## 2024-03-16 VITALS
SYSTOLIC BLOOD PRESSURE: 161 MMHG | RESPIRATION RATE: 18 BRPM | DIASTOLIC BLOOD PRESSURE: 106 MMHG | OXYGEN SATURATION: 98 % | WEIGHT: 165.79 LBS | HEART RATE: 74 BPM | TEMPERATURE: 97 F

## 2024-03-16 DIAGNOSIS — Z98.890 OTHER SPECIFIED POSTPROCEDURAL STATES: Chronic | ICD-10-CM

## 2024-03-16 DIAGNOSIS — Z98.891 HISTORY OF UTERINE SCAR FROM PREVIOUS SURGERY: Chronic | ICD-10-CM

## 2024-03-16 PROCEDURE — 99213 OFFICE O/P EST LOW 20 MIN: CPT

## 2024-03-16 NOTE — ED ADULT TRIAGE NOTE - AS PAIN REST
Faxed Dr Benedicto Sánchez in [place] (ph [phone]/fx [phone]) requesting permission to hold Xarelto for 2 days prior to ERCP tentatively scheduled 6/7/18.  
10 (severe pain)

## 2024-03-16 NOTE — ED ADULT TRIAGE NOTE - CHIEF COMPLAINT QUOTE
PT stated that around 6 Pm tonight she has been unable to move her neck and is in severe pain.  PT stated that she was recently referred to pain management and had steroid injections done on Thursday.

## 2024-03-17 LAB
ALBUMIN SERPL ELPH-MCNC: 3.8 G/DL — SIGNIFICANT CHANGE UP (ref 3.3–5.2)
ALP SERPL-CCNC: 97 U/L — SIGNIFICANT CHANGE UP (ref 40–120)
ALT FLD-CCNC: 13 U/L — SIGNIFICANT CHANGE UP
ANION GAP SERPL CALC-SCNC: 12 MMOL/L — SIGNIFICANT CHANGE UP (ref 5–17)
AST SERPL-CCNC: 17 U/L — SIGNIFICANT CHANGE UP
BASOPHILS # BLD AUTO: 0.06 K/UL — SIGNIFICANT CHANGE UP (ref 0–0.2)
BASOPHILS NFR BLD AUTO: 0.5 % — SIGNIFICANT CHANGE UP (ref 0–2)
BILIRUB SERPL-MCNC: 0.2 MG/DL — LOW (ref 0.4–2)
BUN SERPL-MCNC: 16.8 MG/DL — SIGNIFICANT CHANGE UP (ref 8–20)
CALCIUM SERPL-MCNC: 8.3 MG/DL — LOW (ref 8.4–10.5)
CHLORIDE SERPL-SCNC: 106 MMOL/L — SIGNIFICANT CHANGE UP (ref 96–108)
CO2 SERPL-SCNC: 23 MMOL/L — SIGNIFICANT CHANGE UP (ref 22–29)
CREAT SERPL-MCNC: 0.46 MG/DL — LOW (ref 0.5–1.3)
EGFR: 123 ML/MIN/1.73M2 — SIGNIFICANT CHANGE UP
EOSINOPHIL # BLD AUTO: 0.18 K/UL — SIGNIFICANT CHANGE UP (ref 0–0.5)
EOSINOPHIL NFR BLD AUTO: 1.4 % — SIGNIFICANT CHANGE UP (ref 0–6)
GLUCOSE SERPL-MCNC: 96 MG/DL — SIGNIFICANT CHANGE UP (ref 70–99)
HCG SERPL-ACNC: <4 MIU/ML — SIGNIFICANT CHANGE UP
HCT VFR BLD CALC: 36.6 % — SIGNIFICANT CHANGE UP (ref 34.5–45)
HGB BLD-MCNC: 12.1 G/DL — SIGNIFICANT CHANGE UP (ref 11.5–15.5)
IMM GRANULOCYTES NFR BLD AUTO: 0.4 % — SIGNIFICANT CHANGE UP (ref 0–0.9)
LYMPHOCYTES # BLD AUTO: 25.5 % — SIGNIFICANT CHANGE UP (ref 13–44)
LYMPHOCYTES # BLD AUTO: 3.19 K/UL — SIGNIFICANT CHANGE UP (ref 1–3.3)
MCHC RBC-ENTMCNC: 27.8 PG — SIGNIFICANT CHANGE UP (ref 27–34)
MCHC RBC-ENTMCNC: 33.1 GM/DL — SIGNIFICANT CHANGE UP (ref 32–36)
MCV RBC AUTO: 83.9 FL — SIGNIFICANT CHANGE UP (ref 80–100)
MONOCYTES # BLD AUTO: 0.82 K/UL — SIGNIFICANT CHANGE UP (ref 0–0.9)
MONOCYTES NFR BLD AUTO: 6.6 % — SIGNIFICANT CHANGE UP (ref 2–14)
NEUTROPHILS # BLD AUTO: 8.19 K/UL — HIGH (ref 1.8–7.4)
NEUTROPHILS NFR BLD AUTO: 65.6 % — SIGNIFICANT CHANGE UP (ref 43–77)
PLATELET # BLD AUTO: 232 K/UL — SIGNIFICANT CHANGE UP (ref 150–400)
POTASSIUM SERPL-MCNC: 4 MMOL/L — SIGNIFICANT CHANGE UP (ref 3.5–5.3)
POTASSIUM SERPL-SCNC: 4 MMOL/L — SIGNIFICANT CHANGE UP (ref 3.5–5.3)
PROT SERPL-MCNC: 6.7 G/DL — SIGNIFICANT CHANGE UP (ref 6.6–8.7)
RBC # BLD: 4.36 M/UL — SIGNIFICANT CHANGE UP (ref 3.8–5.2)
RBC # FLD: 12.5 % — SIGNIFICANT CHANGE UP (ref 10.3–14.5)
SODIUM SERPL-SCNC: 141 MMOL/L — SIGNIFICANT CHANGE UP (ref 135–145)
WBC # BLD: 12.49 K/UL — HIGH (ref 3.8–10.5)
WBC # FLD AUTO: 12.49 K/UL — HIGH (ref 3.8–10.5)

## 2024-03-17 PROCEDURE — 70498 CT ANGIOGRAPHY NECK: CPT | Mod: 26,MC

## 2024-03-17 PROCEDURE — 72125 CT NECK SPINE W/O DYE: CPT | Mod: 26,MC

## 2024-03-17 PROCEDURE — 70496 CT ANGIOGRAPHY HEAD: CPT | Mod: 26,MC

## 2024-03-17 PROCEDURE — 99285 EMERGENCY DEPT VISIT HI MDM: CPT

## 2024-03-17 RX ORDER — LIDOCAINE 4 G/100G
1 CREAM TOPICAL ONCE
Refills: 0 | Status: COMPLETED | OUTPATIENT
Start: 2024-03-17 | End: 2024-03-17

## 2024-03-17 RX ORDER — MORPHINE SULFATE 50 MG/1
4 CAPSULE, EXTENDED RELEASE ORAL ONCE
Refills: 0 | Status: DISCONTINUED | OUTPATIENT
Start: 2024-03-17 | End: 2024-03-17

## 2024-03-17 RX ORDER — ACETAMINOPHEN 500 MG
650 TABLET ORAL ONCE
Refills: 0 | Status: COMPLETED | OUTPATIENT
Start: 2024-03-17 | End: 2024-03-17

## 2024-03-17 RX ORDER — HYDROMORPHONE HYDROCHLORIDE 2 MG/ML
1 INJECTION INTRAMUSCULAR; INTRAVENOUS; SUBCUTANEOUS ONCE
Refills: 0 | Status: DISCONTINUED | OUTPATIENT
Start: 2024-03-17 | End: 2024-03-17

## 2024-03-17 RX ORDER — DIAZEPAM 5 MG
5 TABLET ORAL ONCE
Refills: 0 | Status: DISCONTINUED | OUTPATIENT
Start: 2024-03-17 | End: 2024-03-17

## 2024-03-17 RX ORDER — METHOCARBAMOL 500 MG/1
1500 TABLET, FILM COATED ORAL ONCE
Refills: 0 | Status: COMPLETED | OUTPATIENT
Start: 2024-03-17 | End: 2024-03-17

## 2024-03-17 RX ORDER — KETOROLAC TROMETHAMINE 30 MG/ML
30 SYRINGE (ML) INJECTION ONCE
Refills: 0 | Status: DISCONTINUED | OUTPATIENT
Start: 2024-03-17 | End: 2024-03-17

## 2024-03-17 RX ADMIN — METHOCARBAMOL 1500 MILLIGRAM(S): 500 TABLET, FILM COATED ORAL at 16:21

## 2024-03-17 RX ADMIN — Medication 5 MILLIGRAM(S): at 02:23

## 2024-03-17 RX ADMIN — LIDOCAINE 1 PATCH: 4 CREAM TOPICAL at 02:23

## 2024-03-17 RX ADMIN — Medication 650 MILLIGRAM(S): at 03:44

## 2024-03-17 RX ADMIN — MORPHINE SULFATE 4 MILLIGRAM(S): 50 CAPSULE, EXTENDED RELEASE ORAL at 15:01

## 2024-03-17 RX ADMIN — Medication 30 MILLIGRAM(S): at 02:24

## 2024-03-17 RX ADMIN — Medication 30 MILLIGRAM(S): at 03:44

## 2024-03-17 RX ADMIN — MORPHINE SULFATE 4 MILLIGRAM(S): 50 CAPSULE, EXTENDED RELEASE ORAL at 03:44

## 2024-03-17 RX ADMIN — Medication 5 MILLIGRAM(S): at 17:53

## 2024-03-17 RX ADMIN — Medication 650 MILLIGRAM(S): at 02:23

## 2024-03-17 RX ADMIN — MORPHINE SULFATE 4 MILLIGRAM(S): 50 CAPSULE, EXTENDED RELEASE ORAL at 05:57

## 2024-03-17 RX ADMIN — LIDOCAINE 1 PATCH: 4 CREAM TOPICAL at 17:53

## 2024-03-17 RX ADMIN — HYDROMORPHONE HYDROCHLORIDE 1 MILLIGRAM(S): 2 INJECTION INTRAMUSCULAR; INTRAVENOUS; SUBCUTANEOUS at 17:53

## 2024-03-17 NOTE — ED PROVIDER NOTE - PROGRESS NOTE DETAILS
Jani: Pt still complaining of severe neck pain and stiffness radiating up the back of the head. CT C-spine with no actionable findings. Considered ED obs for MR and pain management consult however on secondary review of HPI, pt states symptoms started with associated headache and she does not have a known history of cervical disc disease, she was treated empirically. Will get CTA of the head and neck to r/o vascular pathology.

## 2024-03-17 NOTE — CONSULT NOTE ADULT - SUBJECTIVE AND OBJECTIVE BOX
Pt Name: MARCE KINNEY    MRN: [id number]      Patient is a 43y Female presenting to the emergency department with a chief complaint of cervical spine pain with pain radiating into XXXX arm. Patient states she was recently diagnosed with cervical radiculopathy with concern for a disc herniation after XR at urgent care. Patient went to see a pain management physician this past tuesday and received steroid injections which helped with her pain. However patient has been using meloxicam which has not helped. Denies acute loss of sensation / motor function to b/l UE. No other orthopedic complaints at this time.       HEALTH ISSUES - PROBLEM Dx:    REVIEW OF SYSTEMS      General: Alert, responsive, in NAD    Skin/Breast: No rashes, no pruritis   	  Ophthalmologic: No visual changes. No redness.   	  ENMT:	No discharge. No swelling.    Respiratory and Thorax: No difficulty breathing. No cough.  	   Cardiovascular:	No chest pain. No palpitations.    Gastrointestinal:	 No abdominal pain. No diarrhea.     Genitourinary: No dysuria. No bleeding.    Musculoskeletal: SEE HPI.    Neurological: No sensory or motor changes.     Psychiatric: No anxiety or depression.    Hematology/Lymphatics: No swelling.    Endocrine: No Hx of diabetes.    ROS is otherwise negative.    PAST MEDICAL & SURGICAL HISTORY:  Hypertension      Asthma      2019 novel coronavirus disease (COVID-19)  2021      H/O dilation and curettage      H/O:  section  21        Allergies: No Known Allergies      Medications:     FAMILY HISTORY:  FH: type 2 diabetes (Father)    : non-contributory    Social History:     Ambulation: Walking independently at baseline                           12.1   12.49 )-----------( 232      ( 17 Mar 2024 06:00 )             36.6     03-    141  |  106  |  16.8  ----------------------------<  96  4.0   |  23.0  |  0.46<L>    Ca    8.3<L>      17 Mar 2024 06:00    TPro  6.7  /  Alb  3.8  /  TBili  0.2<L>  /  DBili  x   /  AST  17  /  ALT  13  /  AlkPhos  97  -17      PHYSICAL EXAM:    Vital Signs Last 24 Hrs  T(C): 37.6 (17 Mar 2024 19:20), Max: 37.6 (17 Mar 2024 19:20)  T(F): 99.7 (17 Mar 2024 19:20), Max: 99.7 (17 Mar 2024 19:20)  HR: 85 (17 Mar 2024 19:20) (63 - 85)  BP: 165/75 (17 Mar 2024 19:20) (144/86 - 183/98)  BP(mean): --  RR: 20 (17 Mar 2024 19:20) (16 - 20)  SpO2: 99% (17 Mar 2024 19:20) (98% - 100%)    Parameters below as of 17 Mar 2024 19:20  Patient On (Oxygen Delivery Method): room air      Daily     Daily     Appearance: Alert, responsive, in no acute distress.    Skin: no rash on visible skin. Skin is clean, dry and intact. No bleeding. No abrasions. No ulcerations.    Vascular: 2+ distal pulses. Cap refill < 2 sec. No signs of venous  insufficiency  or stasis. No extremity ulcerations. No cyanosis.    Neurological: Sensation is grossly intact to light touch. No focal deficits or weaknesses found.     Motor exam: [  ]          [ ] Upper extremity              Bi(c5)  WE(c6)  EE(c7)   FF(c8)                                                R         5/5        5/5        5/5       5/5                                               L          5/5        5/5        5/5       5/5         [ ] Lower extremeity          HF(l2)   KE(l3)    TA(l4)   EHL(l5)  GS(s1)                                                 R        5/5        5/5        5/5       5/5         5/5                                               L         5/5        5/5       5/5       5/5          5/5    Imaging Studies:  < from: CT Cervical Spine No Cont (24 @ 12:38) >    ACC: [id number] EXAM:  CT CERVICAL SPINE   ORDERED BY: CHRISTOFER RODRIGUEZ     PROCEDURE DATE:  2024          INTERPRETATION:  EXAM: CT CERVICAL SPINE WITHOUT CONTRAST    HISTORY: Pain    TECHNIQUE: Multiple axial sections were obtained from the skull base to   the sternoclavicular joint. Sagittal and coronal reformats were obtained   from the axial data set. The images were reviewed in soft tissue and bone   windows.    COMPARISON: No prior studies are available for comparison.    FINDINGS:    The atlantodental interval is within normal limits. The lateral masses   are properly aligned. No facet subluxation or malalignment at the   craniovertebral or atlantoaxial articulations. No dens fracture. No   significant prevertebral soft tissue swelling.    Straightening of the cervical lordosis. Normal alignment of the cervical   vertebrae. Vertebral body height is well-maintained. Intervertebral disc   height is well-maintained. No jumped or perched facets. No acute osseous   fracture.    No large disc bulge. Bilateral neuroforamina are largely patent. No maureen   narrowing of the spinal canal.    The paraspinal muscles are unremarkable.    Visualized portions of the thyroid are unremarkable.    The visualized lung apices are within normal limits.      IMPRESSION:    No evidence of acute osseous fracture or maureen dislocation. In the   setting of myelopathy, recommend MRI of the cervical spine for further   evaluation.    --- End of Report ---        DOROTHY REESE MD; Attending Radiologist  This document has been electronically signed. Mar 17 2024  1:44PM    < end of copied text >    A/P:  Pt is a  43y Female with cervical radiculopathy     PLAN:  * Pain control, consider pain management consult  * MRI ordered  * CT reviewed by Dr. Byrd -  Treatment plan to be finalized after review of pending imaging studies  * D/W Dr. Howard orthopedic spine attending    Pt Name: MARCE KINNEY    MRN: [id number]      Patient is a 43y Female presenting to the emergency department with a chief complaint of cervical spine pain without radicular symptoms, no numbness / tingling or pain radiation into arms b/l . Patient states she was recently diagnosed with cervical radiculopathy with concern for a disc herniation after XR at urgent care. Patient went to see a pain management physician this past tuesday and received steroid injections which helped with her pain. However patient has been using meloxicam which has not helped. Denies acute loss of sensation / motor function to b/l UE. No other orthopedic complaints at this time.       HEALTH ISSUES - PROBLEM Dx:    REVIEW OF SYSTEMS      General: Alert, responsive, in NAD    Skin/Breast: No rashes, no pruritis   	  Ophthalmologic: No visual changes. No redness.   	  ENMT:	No discharge. No swelling.    Respiratory and Thorax: No difficulty breathing. No cough.  	   Cardiovascular:	No chest pain. No palpitations.    Gastrointestinal:	 No abdominal pain. No diarrhea.     Genitourinary: No dysuria. No bleeding.    Musculoskeletal: SEE HPI.    Neurological: No sensory or motor changes.     Psychiatric: No anxiety or depression.    Hematology/Lymphatics: No swelling.    Endocrine: No Hx of diabetes.    ROS is otherwise negative.    PAST MEDICAL & SURGICAL HISTORY:  Hypertension      Asthma      2019 novel coronavirus disease (COVID-19)  2021      H/O dilation and curettage      H/O:  section  21        Allergies: No Known Allergies      Medications:     FAMILY HISTORY:  FH: type 2 diabetes (Father)    : non-contributory    Social History:     Ambulation: Walking independently at baseline                           12.1   12.49 )-----------( 232      ( 17 Mar 2024 06:00 )             36.6     03-    141  |  106  |  16.8  ----------------------------<  96  4.0   |  23.0  |  0.46<L>    Ca    8.3<L>      17 Mar 2024 06:00    TPro  6.7  /  Alb  3.8  /  TBili  0.2<L>  /  DBili  x   /  AST  17  /  ALT  13  /  AlkPhos  97        PHYSICAL EXAM:    Vital Signs Last 24 Hrs  T(C): 37.6 (17 Mar 2024 19:20), Max: 37.6 (17 Mar 2024 19:20)  T(F): 99.7 (17 Mar 2024 19:20), Max: 99.7 (17 Mar 2024 19:20)  HR: 85 (17 Mar 2024 19:20) (63 - 85)  BP: 165/75 (17 Mar 2024 19:20) (144/86 - 183/98)  BP(mean): --  RR: 20 (17 Mar 2024 19:20) (16 - 20)  SpO2: 99% (17 Mar 2024 19:20) (98% - 100%)    Parameters below as of 17 Mar 2024 19:20  Patient On (Oxygen Delivery Method): room air      Daily     Daily     Appearance: Alert, responsive, in no acute distress.    Skin: no rash on visible skin. Skin is clean, dry and intact. No bleeding. No abrasions. No ulcerations.    Vascular: 2+ distal pulses. Cap refill < 2 sec. No signs of venous  insufficiency  or stasis. No extremity ulcerations. No cyanosis.    Neurological: Sensation is grossly intact to light touch. No focal deficits or weaknesses found.     Motor exam: [ x ]          [x] Upper extremity              Bi(c5)  WE(c6)  EE(c7)   FF(c8)                                            R         5/5        5/5        5/5       5/5                                           L          5/5        5/5        5/5       5/5  No cervical spine tenderness to palpation, no stepoffs noted, SILT to b/l UE, no subjective radiation of pain or numbness to b/l UE, 5/5 strength to b/l shoulders, elbows, wrist, and hand/digits b/l, normal AROM of b/l UE    Imaging Studies:  < from: CT Cervical Spine No Cont (24 @ 12:38) >    ACC: [id number] EXAM:  CT CERVICAL SPINE   ORDERED BY: CHRISTOFER RODRIGUEZ     PROCEDURE DATE:  2024          INTERPRETATION:  EXAM: CT CERVICAL SPINE WITHOUT CONTRAST    HISTORY: Pain    TECHNIQUE: Multiple axial sections were obtained from the skull base to   the sternoclavicular joint. Sagittal and coronal reformats were obtained   from the axial data set. The images were reviewed in soft tissue and bone   windows.    COMPARISON: No prior studies are available for comparison.    FINDINGS:    The atlantodental interval is within normal limits. The lateral masses   are properly aligned. No facet subluxation or malalignment at the   craniovertebral or atlantoaxial articulations. No dens fracture. No   significant prevertebral soft tissue swelling.    Straightening of the cervical lordosis. Normal alignment of the cervical   vertebrae. Vertebral body height is well-maintained. Intervertebral disc   height is well-maintained. No jumped or perched facets. No acute osseous   fracture.    No large disc bulge. Bilateral neuroforamina are largely patent. No maureen   narrowing of the spinal canal.    The paraspinal muscles are unremarkable.    Visualized portions of the thyroid are unremarkable.    The visualized lung apices are within normal limits.      IMPRESSION:    No evidence of acute osseous fracture or maureen dislocation. In the   setting of myelopathy, recommend MRI of the cervical spine for further   evaluation.    --- End of Report ---        DOROTHY REESE MD; Attending Radiologist  This document has been electronically signed. Mar 17 2024  1:44PM    < end of copied text >    A/P:  Pt is a  43y Female with cervical spine pain r/u herniation     PLAN:  * Pain control, consider pain management consult  * MRI-C spinr ordered  * CT reviewed by Dr. Byrd no acute findings  -  Treatment plan to be finalized after review of pending imaging studies  * D/W Dr. Howard orthopedic spine attending

## 2024-03-17 NOTE — ED PROVIDER NOTE - PHYSICAL EXAMINATION
Constitutional: Uncomfortable appearing, awake, alert, oriented to person, place, and time/situation and in no apparent distress  ENMT: Airway patent nasal mucosa clear. Mouth with normal mucosa. Throat has no vesicles no oropharyngeal exudates and uvula is midline. No blood in the oropharynx  EYES: clear bilaterally, pupils equal, round and reactive to light  Cardiac: Regular rate, regular rhythm. Heart sounds S1, S2. No murmurs, rubs or gallops. Good capillary refill, 2+ pulses, no peripheral edema  Respiratory: Lungs CTAB, no use of accessory muscles, no crackles, satting 99% on RA in no distress  Gastrointestinal: Abdomen nondistended, non-tender, no rebound guarding or peritoneal signs  Genitourinary: No CVA tenderness, pelvis nontender, bladder nondistended  Musculoskeletal: Spine appears normal, range of motion is not limited, paraspinal cervical ttp no midline ttp no stepoffs no obvious deformities, pain limited ROM   Neurological: Alert and oriented, no focal deficits, no motor or sensory deficits. CN 2-12 intact, PERRLA, EOMI, No FND, moving all extremities equally, sensation intact, No dysmetria, no ataxia, negative heel-shin, 5/5 strength
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
## 2024-03-17 NOTE — ED ADULT NURSE NOTE - ED STAT RN HANDOFF DETAILS
Gave report to OBS RN Rosa, patient is awake, clam, rr even and unlabored, transported to a7L, call bell placed within reach, stretcher locked in lowest position, waiting for mri.

## 2024-03-17 NOTE — ED PROVIDER NOTE - CLINICAL SUMMARY MEDICAL DECISION MAKING FREE TEXT BOX
Patient is a 42 yo female with PMHx cervical radiculopathy presenting with severe cervical neck pain. VSS, cervical paraspinal ttp no midline ttp, neurovascularly intact, no red flag symptoms.     Will treat likely MSK pain, reassess, continue to monitor. Low suspicion for fracture vs. cord compression given benign physical exam and history.

## 2024-03-17 NOTE — ED PROVIDER NOTE - ATTENDING CONTRIBUTION TO CARE
43-year-old female history of cervical radiculopathy presents with cervical neck pain.  Patient had x-ray done in urgent care and told that she had nerve impingement.  She saw  pain management and had epidural done that believe mild pain but she reports severe neck pain and unable to move.Patient given Valium, Toradol, lidocaine patch, Tylenol with minimal improvement.  Patient given IV morphine still having pain, blood work unremarkable.  Pending CT cervical spine and additional pain control.  If pain not relieved with medication may need to see pain management and/or MRI.

## 2024-03-18 VITALS
TEMPERATURE: 98 F | DIASTOLIC BLOOD PRESSURE: 78 MMHG | SYSTOLIC BLOOD PRESSURE: 133 MMHG | OXYGEN SATURATION: 98 % | HEART RATE: 85 BPM | RESPIRATION RATE: 18 BRPM

## 2024-03-18 PROCEDURE — 99284 EMERGENCY DEPT VISIT MOD MDM: CPT | Mod: 25

## 2024-03-18 PROCEDURE — 96372 THER/PROPH/DIAG INJ SC/IM: CPT | Mod: XU

## 2024-03-18 PROCEDURE — 85025 COMPLETE CBC W/AUTO DIFF WBC: CPT

## 2024-03-18 PROCEDURE — 96374 THER/PROPH/DIAG INJ IV PUSH: CPT | Mod: XU

## 2024-03-18 PROCEDURE — G0378: CPT

## 2024-03-18 PROCEDURE — 96376 TX/PRO/DX INJ SAME DRUG ADON: CPT | Mod: XU

## 2024-03-18 PROCEDURE — 72125 CT NECK SPINE W/O DYE: CPT | Mod: MC

## 2024-03-18 PROCEDURE — 84702 CHORIONIC GONADOTROPIN TEST: CPT

## 2024-03-18 PROCEDURE — 72141 MRI NECK SPINE W/O DYE: CPT | Mod: 26,MC

## 2024-03-18 PROCEDURE — 80053 COMPREHEN METABOLIC PANEL: CPT

## 2024-03-18 PROCEDURE — 70498 CT ANGIOGRAPHY NECK: CPT | Mod: MC

## 2024-03-18 PROCEDURE — 70496 CT ANGIOGRAPHY HEAD: CPT | Mod: MC

## 2024-03-18 PROCEDURE — 99236 HOSP IP/OBS SAME DATE HI 85: CPT

## 2024-03-18 PROCEDURE — 72141 MRI NECK SPINE W/O DYE: CPT | Mod: MC

## 2024-03-18 PROCEDURE — 96375 TX/PRO/DX INJ NEW DRUG ADDON: CPT

## 2024-03-18 PROCEDURE — 36415 COLL VENOUS BLD VENIPUNCTURE: CPT

## 2024-03-18 RX ORDER — LIDOCAINE 4 G/100G
1 CREAM TOPICAL
Qty: 2 | Refills: 0
Start: 2024-03-18 | End: 2024-03-27

## 2024-03-18 RX ORDER — KETOROLAC TROMETHAMINE 30 MG/ML
30 SYRINGE (ML) INJECTION EVERY 6 HOURS
Refills: 0 | Status: COMPLETED | OUTPATIENT
Start: 2024-03-18 | End: 2024-03-23

## 2024-03-18 RX ORDER — DIAZEPAM 5 MG
5 TABLET ORAL EVERY 8 HOURS
Refills: 0 | Status: COMPLETED | OUTPATIENT
Start: 2024-03-18 | End: 2024-03-25

## 2024-03-18 RX ORDER — DEXAMETHASONE 0.5 MG/5ML
10 ELIXIR ORAL ONCE
Refills: 0 | Status: COMPLETED | OUTPATIENT
Start: 2024-03-18 | End: 2024-03-18

## 2024-03-18 RX ORDER — MORPHINE SULFATE 50 MG/1
4 CAPSULE, EXTENDED RELEASE ORAL EVERY 4 HOURS
Refills: 0 | Status: DISCONTINUED | OUTPATIENT
Start: 2024-03-18 | End: 2024-03-18

## 2024-03-18 RX ORDER — DEXAMETHASONE 0.5 MG/5ML
10 ELIXIR ORAL ONCE
Refills: 0 | Status: DISCONTINUED | OUTPATIENT
Start: 2024-03-18 | End: 2024-03-18

## 2024-03-18 RX ORDER — CYCLOBENZAPRINE HYDROCHLORIDE 10 MG/1
10 TABLET, FILM COATED ORAL ONCE
Refills: 0 | Status: COMPLETED | OUTPATIENT
Start: 2024-03-18 | End: 2024-03-18

## 2024-03-18 RX ORDER — SODIUM CHLORIDE 9 MG/ML
1000 INJECTION INTRAMUSCULAR; INTRAVENOUS; SUBCUTANEOUS
Refills: 0 | Status: DISCONTINUED | OUTPATIENT
Start: 2024-03-18 | End: 2024-03-24

## 2024-03-18 RX ORDER — LIDOCAINE 4 G/100G
1 CREAM TOPICAL DAILY
Refills: 0 | Status: DISCONTINUED | OUTPATIENT
Start: 2024-03-18 | End: 2024-03-24

## 2024-03-18 RX ORDER — MELOXICAM 15 MG/1
1 TABLET ORAL
Qty: 15 | Refills: 0
Start: 2024-03-18 | End: 2024-04-01

## 2024-03-18 RX ORDER — GABAPENTIN 400 MG/1
300 CAPSULE ORAL ONCE
Refills: 0 | Status: COMPLETED | OUTPATIENT
Start: 2024-03-18 | End: 2024-03-18

## 2024-03-18 RX ORDER — CYCLOBENZAPRINE HYDROCHLORIDE 10 MG/1
1 TABLET, FILM COATED ORAL
Qty: 30 | Refills: 0
Start: 2024-03-18 | End: 2024-03-27

## 2024-03-18 RX ADMIN — LIDOCAINE 1 PATCH: 4 CREAM TOPICAL at 09:28

## 2024-03-18 RX ADMIN — Medication 5 MILLIGRAM(S): at 13:20

## 2024-03-18 RX ADMIN — GABAPENTIN 300 MILLIGRAM(S): 400 CAPSULE ORAL at 09:27

## 2024-03-18 RX ADMIN — Medication 30 MILLIGRAM(S): at 06:04

## 2024-03-18 RX ADMIN — Medication 5 MILLIGRAM(S): at 06:03

## 2024-03-18 RX ADMIN — Medication 30 MILLIGRAM(S): at 06:15

## 2024-03-18 RX ADMIN — SODIUM CHLORIDE 125 MILLILITER(S): 9 INJECTION INTRAMUSCULAR; INTRAVENOUS; SUBCUTANEOUS at 09:27

## 2024-03-18 RX ADMIN — Medication 102 MILLIGRAM(S): at 09:28

## 2024-03-18 RX ADMIN — CYCLOBENZAPRINE HYDROCHLORIDE 10 MILLIGRAM(S): 10 TABLET, FILM COATED ORAL at 09:28

## 2024-03-18 RX ADMIN — Medication 30 MILLIGRAM(S): at 11:23

## 2024-03-18 NOTE — ED CDU PROVIDER DISPOSITION NOTE - NSFOLLOWUPINSTRUCTIONS_ED_ALL_ED_FT
-Please take your medication as prescribed  - follow-up with your primary care doctor with 1 or 2 days  - follow-up with spine/neurosurgery as given for further evaluations  - come back to the emergency room if any worsening of neck pain, headache or dizziness, numbness or tingling in arms and legs, headache or dizziness, or any new concerns  - note cyclobenzaprine causes sleepiness/drowsiness do not drive or use machinery while taking medication  - note cyclobenzaprine and tizanidine are the same medication do not take all 2 medications together    Sprain    A sprain is a stretch or tear in one of the tough, fiber-like tissues (ligaments) in your body. This is caused by an injury to the area such as a twisting mechanism. Symptoms include pain, swelling, or bruising. Rest that area over the next several days and slowly resume activity when tolerated. Ice can help with swelling and pain.     SEEK IMMEDIATE MEDICAL CARE IF YOU HAVE ANY OF THE FOLLOWING SYMPTOMS: worsening pain, inability to move that body part, numbness or tingling.

## 2024-03-18 NOTE — ED CDU PROVIDER DISPOSITION NOTE - CLINICAL COURSE
42 yo female with Hx post partum pre-eclampsia recently taken off BP meds presenting with severe cervical neck pain. Pt reports neck pain x 1 week, went to urgent care and had xray done concern for disc herniation, was referred to f/u with pain mgmt. Pt saw pain mgmt had injections done to top of head and bilateral base of neck, reports she had instant improvement, however since saturday pain has now been worsening. Pain initially mostly to left side of neck, with associated HA, but now since returning is to bilateral neck , limited ROM due to pain, and has assocaited posterior headache.  Pt was prescribed meloxicam and tizanidine she had been taking at home w/o any relief. Denies falls, paresthesias, blood thinners, IVDU, fevers, weakness, n/v, dizziness. patient kept in observation for MRI pain management. MRI without significant finding. patient after given the cyclobenzaprine and gabapentin and lidocaine patch and second dose of the Decadron she felt a lot better range of motion of the neck significantly improved able to turn her head [id number] without difficulty. denies any fever or headache now. pressure follow-up with spine

## 2024-03-18 NOTE — ED CDU PROVIDER DISPOSITION NOTE - ATTENDING CONTRIBUTION TO CARE
I, Bandar Garcia, performed a face to face bedside interview with this patient regarding history of present illness, and completed an independent physical examination. I personally made/approved the management plan and take responsibility for the patient management. I have communicated the patient’s plan of care and disposition with the ACP.  Patient placed on observation for MRI of cervical spine for neck stiffness.  No neuro Sx.  Gen: NAD, well appearing  CV: RRR  Pul: CTA b/l  Abd: Soft, non-distended, non-tender  Neuro: no focal deficits  msk: no midline spina ttp, +ttp and spasticity of the L paraspinal cervical musculature  Pt improved, States that her pain is much better and range movement improved.  The symptoms have been present for 1 week now, with the lack of fever.  The patient would be toxic appearing  or obtunded if this were a bacterial meningitis.  Furthermore, if this were a viral meningitis we would expect fever, the patient has never had 1 reported home nor measured here.  MRI is without significant spinal pathology.  Given this, as well as the exam findings and seem to corroborate that this is a more lateral and muscular spasticity in the muscle, and the patient's improvement with the medication regimen here, patient stable for discharge.  Does not fit clinical scenario to suggest either meningitis no subarachnoid hemorrhage.

## 2024-03-18 NOTE — ED ADULT NURSE REASSESSMENT NOTE - NS ED NURSE REASSESS COMMENT FT1
Assumed care of patient at this time 19:30 from dayshift RN Angelina, charting as noted, patient is resting comfortably, awakens to verbal stimuli, RR even and unlabored, denies sob, denies chest pain, reports pain relief after receiving ordered pain meds, waiting for ct, stretcher locked in lowest position.
Gave report to OBS RN Rosa, patient is awake, calm, rr, even and unlabored, transported to a7L, call bell placed within reach, stretcher locked in lowest position, waiting for mri.
PT resting comfortably on stretcher, with intermittent moaning . v.s.s.
Pt medicated for c/o severe neck pain, pt woken up from sleeping in high fowlers position for pain meds.
Pt sleeping, resp even and unlabored, awaiting for CTA, pt updated on plan of care, questions asked and answered.
Report received from off going RN, care of pt assumed at 0730.  pt received sleeping on stretcher, resp even and unlabored. arousable to voice, denies any new c/o at this time. currently awaiting for ct scan, pt updated on plan of care, questions asked and answered.
assumed pt care at 06:00, received report from STEFAN Ogden , no apparent distress noted at this time, charting as noted. pt received a&ox4 resting in bed, rr even and unlabored on ra. pt reports 10/10 pain to the neck. Medications to be given, see MAR. pt given warm compress to help relieve pain. pt updated on plan of care, awaiting MRI scan and pain management consult.
waiting for mri, rr even and unlabored, able to move all extremities.
Assumed care of pt at 07:15 as stated in report from RN ST. Charting as noted. Patient A&O x4, complains of intermittent neck pain radiating to head denies currently, denies CP/SOB. Updated on the plan of care. Call bell within reach, bed locked in lowest position. IV site flushed w/ NS. No redness, swelling or pain noted to site. No signs of acute distress noted, safety maintained. Pt remains on CM in NSR.

## 2024-03-18 NOTE — ED CDU PROVIDER INITIAL DAY NOTE - PHYSICAL EXAMINATION
Gen: No acute distress, non toxic  HEENT: Mucous membranes moist, pink conjunctivae, PERRL, EOMI  CV: RRR, nl s1/s2.  Resp: CTAB, normal rate and effort  GI: Abdomen soft, NT, ND. No rebound, no guarding  : No CVAT  Neuro: A&O x 3, CNII-XII grossly intact, moving all 4 extremities  MSK: +B/L cervical paraspinal tenderness to palpation, limited ROM neck, 5/5 strength BUE/BLE, sensation intact throughout, 2+ distal pulses  Skin: No rashes. intact and perfused.

## 2024-03-18 NOTE — ED CDU PROVIDER INITIAL DAY NOTE - CLINICAL SUMMARY MEDICAL DECISION MAKING FREE TEXT BOX
42 yo female with Hx post partum pre-eclampsia recently taken off BP meds presenting with severe cervical neck pain. Pt reports neck pain x 1 week, went to urgent care and had xray done concern for disc herniation, was referred to f/u with pain mgmt. Pt saw pain mgmt had injections done to top of head and bilateral base of neck, reports she had instant improvement, however since saturday pain has now been worsening. Pain initially mostly to left side of neck, with associated HA, but now bilateral. +B/L cervical paraspinal tenderness, limited ROM neck, ct c-spine and CTA head/neck w/o acute findings.  Seen by spine. Pt placed on obs for MRI and pain control, pain mgmt consult

## 2024-03-18 NOTE — ED CDU PROVIDER DISPOSITION NOTE - ATTENDING APP SHARED VISIT CONTRIBUTION OF CARE
I, Bandar aGrcia, performed a face to face bedside interview with this patient regarding history of present illness, and completed an independent physical examination. I personally made/approved the management plan and take responsibility for the patient management. I have communicated the patient’s plan of care and disposition with the ACP.  Patient placed on observation for MRI of cervical spine for neck stiffness.  No neuro Sx.  Gen: NAD, well appearing  CV: RRR  Pul: CTA b/l  Abd: Soft, non-distended, non-tender  Neuro: no focal deficits  msk: no midline spina ttp, +ttp and spasticity of the L paraspinal cervical musculature  Pt improved, States that her pain is much better and range movement improved.  The symptoms have been present for 1 week now, with the lack of fever.  The patient would be toxic appearing  or obtunded if this were a bacterial meningitis.  Furthermore, if this were a viral meningitis we would expect fever, the patient has never had 1 reported home nor measured here.  MRI is without significant spinal pathology.  Given this, as well as the exam findings and seem to corroborate that this is a more lateral and muscular spasticity in the muscle, and the patient's improvement with the medication regimen here, patient stable for discharge.  Does not fit clinical scenario to suggest either meningitis no subarachnoid hemorrhage.

## 2024-03-18 NOTE — ED CDU PROVIDER DISPOSITION NOTE - CARE PROVIDER_API CALL
Marcela Sanz  Neurosurgery  [address] [place] [id number]  Phone: [phone]  Fax: [phone]  Follow Up Time: 1-3 Days

## 2024-03-18 NOTE — ED CDU PROVIDER INITIAL DAY NOTE - OBJECTIVE STATEMENT
44 yo female with Hx post partum pre-eclampsia recently taken off BP meds presenting with severe cervical neck pain. Pt reports neck pain x 1 week, went to urgent care and had xray done concern for disc herniation, was referred to f/u with pain mgmt. Pt saw pain mgmt had injections done to top of head and bilateral base of neck, reports she had instant improvement, however since saturday pain has now been worsening. Pain initially mostly to left side of neck, with associated HA, but now since returning is to bilateral neck , limited ROM due to pain, and has assocaited posterior headache.  Pt was prescribed meloxicam and tizanidine she had been taking at home w/o any relief. Denies falls, paresthesias, blood thinners, IVDU, fevers, weakness, n/v, dizziness.

## 2024-03-18 NOTE — ED CDU PROVIDER DISPOSITION NOTE - PATIENT PORTAL LINK FT
You can access the FollowMyHealth Patient Portal offered by [facility] by registering at the following website: [email or web address]. By joining [facility]’s FollowMyHealth portal, you will also be able to view your health information using other applications (apps) compatible with our system.

## 2024-03-18 NOTE — ED CDU PROVIDER INITIAL DAY NOTE - NS ED ATTENDING STATEMENT MOD
I have seen and examined this patient and fully participated in the care of this patient as the teaching attending.  The service was shared with the MARLIN.  I reviewed and verified the documentation.

## 2024-03-18 NOTE — ED CDU PROVIDER INITIAL DAY NOTE - ATTENDING CONTRIBUTION TO CARE
I, Zelalem Awad, personally saw the patient with the PA, and completed the key components of the history and physical exam. I then discussed the management plan with the PA.

## 2024-03-19 NOTE — OB PROVIDER DELIVERY SUMMARY - NSCOUNTCORRECT_OBGYN_ALL_OB
LVM informing pt mother the appointment is now cancelled and she can return call or message to clinic to reschedule it as provider will be in surgery at that time.   
Laps, needles and instruments count was reported as correct.

## 2024-03-27 ENCOUNTER — APPOINTMENT (OUTPATIENT)
Dept: NEUROSURGERY | Facility: CLINIC | Age: 44
End: 2024-03-27
Payer: COMMERCIAL

## 2024-03-27 VITALS
OXYGEN SATURATION: 100 % | WEIGHT: 160 LBS | HEART RATE: 86 BPM | HEIGHT: 62.5 IN | DIASTOLIC BLOOD PRESSURE: 95 MMHG | BODY MASS INDEX: 28.71 KG/M2 | TEMPERATURE: 98.2 F | SYSTOLIC BLOOD PRESSURE: 138 MMHG

## 2024-03-27 PROCEDURE — 99202 OFFICE O/P NEW SF 15 MIN: CPT

## 2024-03-27 RX ORDER — MELOXICAM 15 MG/1
15 TABLET ORAL
Refills: 0 | Status: ACTIVE | COMMUNITY

## 2024-03-27 RX ORDER — CYCLOBENZAPRINE HYDROCHLORIDE 10 MG/1
10 TABLET, FILM COATED ORAL
Refills: 0 | Status: ACTIVE | COMMUNITY

## 2024-03-27 NOTE — ASSESSMENT
[FreeTextEntry1] : Ms. Marylu Royal is a very pleasant 43 year old female who presents with neck pain with acute onset since last week.  Her symptoms have overall improved and she is seeing pain management.  She still has some stiffness and soreness in her neck and so I would like her to see physical therapy.  I reviewed her MRI imaging with her which demonstrates multilevel degenerative disc disease but without significant central or foraminal stenosis.  She was curious as to the cause of her pain and I discussed with her that unfortunately pain is multifactorial and we may not know exactly what the cause of her pain is.  Either way though there is nothing concerning on her MRI that would necessitate surgery.  I would like to see her back in 6 weeks.  All questions answered.  She knows to call my office with any issues or concerns.

## 2024-03-27 NOTE — HISTORY OF PRESENT ILLNESS
[de-identified] : Ms. Marylu Royal is a very pleasant 43 year old female who presents with neck pain. She reports that on , she began having discomfort while she was turning her head when driving.  The next day, she was taking care of her kids at home carrying her 6-month-old when she noticed that she was having trouble tilting her head down. The following day on Wednesday, she noticed a stiff neck at the start of the day, but by the end of the day it was so severe that she had to go to [facility] urgent care.  She was set up with pain management, and she saw Dr. Alonso on Thursday who gave her several steroid injections.  By the weekend, she was having severe pressure on the back of her head and headaches to the point where she was not able to lay down so she went to [facility] ER.  This pain was rated 10 out of 10.    Currently she reports that overall her symptoms are better.  She still has soreness in her neck that is rated 1 out of 10.  She also notes some continued stiffness and mobility issues with her neck.  She is taking meloxicam and Flexeril. No recent physical therapy.  No pain in her arms.  No numbness or tingling, no difficulty with walking.  PMH/PSH:  Works as  at [employer] Lives at home with , 6-month-old, and 3-year-old.

## 2024-03-27 NOTE — PHYSICAL EXAM
[General Appearance - In No Acute Distress] : in no acute distress [General Appearance - Alert] : alert [Oriented To Time, Place, And Person] : oriented to person, place, and time [Motor Tone] : muscle tone was normal in all four extremities [Motor Strength] : muscle strength was normal in all four extremities [FreeTextEntry6] : BUE/BLE 5/5 strength No martínez's

## 2024-04-11 ENCOUNTER — APPOINTMENT (OUTPATIENT)
Dept: CARDIOLOGY | Facility: CLINIC | Age: 44
End: 2024-04-11
Payer: COMMERCIAL

## 2024-04-11 VITALS
DIASTOLIC BLOOD PRESSURE: 88 MMHG | BODY MASS INDEX: 28.71 KG/M2 | WEIGHT: 160 LBS | SYSTOLIC BLOOD PRESSURE: 150 MMHG | HEART RATE: 92 BPM | OXYGEN SATURATION: 98 % | HEIGHT: 62.5 IN

## 2024-04-11 DIAGNOSIS — Z87.891 PERSONAL HISTORY OF NICOTINE DEPENDENCE: ICD-10-CM

## 2024-04-11 PROCEDURE — 99213 OFFICE O/P EST LOW 20 MIN: CPT

## 2024-04-11 PROCEDURE — G2211 COMPLEX E/M VISIT ADD ON: CPT

## 2024-04-11 RX ORDER — NIFEDIPINE 30 MG/1
30 TABLET, FILM COATED, EXTENDED RELEASE ORAL DAILY
Qty: 90 | Refills: 3 | Status: ACTIVE | COMMUNITY
Start: 2024-04-11 | End: 1900-01-01

## 2024-04-11 RX ORDER — PRENATAL VIT NO.126/IRON/FOLIC 28MG-0.8MG
28-0.8 TABLET ORAL DAILY
Refills: 0 | Status: DISCONTINUED | COMMUNITY
End: 2024-04-11

## 2024-04-11 NOTE — DISCUSSION/SUMMARY
[FreeTextEntry1] : 42 y/o F, , s/p delivery via repeat CS 23 with subsequent readmission 23 due to postpartum PEC. Was discharged on Procardia XL 30mg. BP is uncontrolled off procardia; would restart. Urine protein/Cr ratio was checked, 0.1. Patient counseled on increased risk of PEC with subsequent pregnancies, as well as overall increased risk of cHTN and CAD. Advised to eat a heart healthy diet and aim for 150 mins of moderate intensity exercise/week.

## 2024-04-11 NOTE — HISTORY OF PRESENT ILLNESS
[FreeTextEntry1] : 44 y/o F, , s/p delivery via repeat CS 23 with subsequent readmission 23 due to postpartum PEC. Was discharged on Procardia XL 30mg. She reports PEC during her prior pregnancy as well, but states she did not require antihypertensives at that time. She denies any other PMH.   Family Hx: denies premature CAD or sudden cardiac death  Social Hx: former smoker, 1 pack per week x 10 years, quit 5 years ago; occasional ETOH, denies illicit drug use   Patient was last seen by me 24. She was trialed off procardia. Monitors ambulatory BP, remains above goal  Developed severe neck pain following last visit and was found to have DJD/herniated disks. Remains in significant discomfort at time of exam   Otherwise feels well. Denies HA, blurry vision, CP, dyspnea, palpitations, dizziness, lightheadedness, LE edema.

## 2024-05-02 ENCOUNTER — NON-APPOINTMENT (OUTPATIENT)
Age: 44
End: 2024-05-02

## 2024-05-03 ENCOUNTER — APPOINTMENT (OUTPATIENT)
Dept: NEUROSURGERY | Facility: CLINIC | Age: 44
End: 2024-05-03
Payer: COMMERCIAL

## 2024-05-03 VITALS
OXYGEN SATURATION: 99 % | TEMPERATURE: 98.4 F | SYSTOLIC BLOOD PRESSURE: 136 MMHG | BODY MASS INDEX: 28.71 KG/M2 | HEART RATE: 75 BPM | HEIGHT: 62.5 IN | WEIGHT: 160 LBS | DIASTOLIC BLOOD PRESSURE: 89 MMHG

## 2024-05-03 DIAGNOSIS — M54.2 CERVICALGIA: ICD-10-CM

## 2024-05-03 PROCEDURE — 99212 OFFICE O/P EST SF 10 MIN: CPT

## 2024-05-04 NOTE — ASSESSMENT
[FreeTextEntry1] : Ms. Marylu Royal is a very pleasant 43 year old female who presents for follow up of neck pain. Fortunately, her symptoms have significantly improved and I would like her to continue doing physical therapy. I will see her back in 2-3 months to check on how she is doing. All questions answered. She knows to call my office with any issues or concerns.

## 2024-05-04 NOTE — HISTORY OF PRESENT ILLNESS
[FreeTextEntry1] : Ms. Marylu Royal is a very pleasant 43 year old female who presents for follow up of neck pain.  She was initially seen at the end of March and fortunately her MRI did not show an acute abnormality that warranted surgery.  I recommended conservative therapy for her.  She reports that since her last visit things are better.  Her pain is rated about 1 out of 10.  She reports that it still is a bit sore, and particularly when she is turning to the right side.  Her left-sided turning mobility has significantly improved.  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  She is only taking pain medication after her physical therapy sessions but otherwise does not need it.  She denies any new symptoms.  She has not needed any additional pain injections from Dr. Alonso.

## 2024-07-25 ENCOUNTER — APPOINTMENT (OUTPATIENT)
Dept: CARDIOLOGY | Facility: CLINIC | Age: 44
End: 2024-07-25
Payer: COMMERCIAL

## 2024-07-25 ENCOUNTER — NON-APPOINTMENT (OUTPATIENT)
Age: 44
End: 2024-07-25

## 2024-07-25 VITALS
HEIGHT: 62 IN | BODY MASS INDEX: 30.55 KG/M2 | WEIGHT: 166 LBS | HEART RATE: 74 BPM | OXYGEN SATURATION: 98 % | SYSTOLIC BLOOD PRESSURE: 130 MMHG | DIASTOLIC BLOOD PRESSURE: 82 MMHG

## 2024-07-25 DIAGNOSIS — Z87.891 PERSONAL HISTORY OF NICOTINE DEPENDENCE: ICD-10-CM

## 2024-07-25 DIAGNOSIS — E66.9 OBESITY, UNSPECIFIED: ICD-10-CM

## 2024-07-25 PROCEDURE — 99214 OFFICE O/P EST MOD 30 MIN: CPT | Mod: 25

## 2024-07-25 PROCEDURE — 93000 ELECTROCARDIOGRAM COMPLETE: CPT

## 2024-07-25 PROCEDURE — G2211 COMPLEX E/M VISIT ADD ON: CPT | Mod: NC

## 2024-07-25 RX ORDER — DICLOFENAC SODIUM 75 MG/1
75 TABLET, DELAYED RELEASE ORAL
Refills: 0 | Status: ACTIVE | COMMUNITY

## 2024-07-25 RX ORDER — TIZANIDINE 4 MG/1
4 TABLET ORAL
Refills: 0 | Status: ACTIVE | COMMUNITY

## 2024-07-25 NOTE — HISTORY OF PRESENT ILLNESS
[FreeTextEntry1] : 42 y/o F, , s/p delivery via repeat CS 23 with subsequent readmission 23 due to postpartum PEC. Was discharged on Procardia XL 30mg. She reports PEC during her prior pregnancy as well, but states she did not require antihypertensives at that time. She denies any other PMH.  Family Hx: denies premature CAD or sudden cardiac death  Social Hx: former smoker, 1 pack per week x 10 years, quit 5 years ago; occasional ETOH, denies illicit drug use   Patient was last seen by me 24; procardia was restarted due to uncontrolled BP  She currently feels well. Denies HA, blurry vision, CP, dyspnea, palpitations, dizziness, lightheadedness, LE edema.

## 2024-07-25 NOTE — DISCUSSION/SUMMARY
[FreeTextEntry1] : 44 y/o F, , s/p delivery via repeat CS 23 with subsequent readmission 23 due to postpartum PEC. Was discharged on Procardia XL 30mg. BP currently controlled. Urine protein/Cr ratio was checked, 0.1. Patient counseled on increased risk of cHTN and CAD. Advised to eat a heart healthy diet and aim for 150 mins of moderate intensity exercise/week. Check TTE for baseline LV function.  [EKG obtained to assist in diagnosis and management of assessed problem(s)] : EKG obtained to assist in diagnosis and management of assessed problem(s)

## 2024-07-25 NOTE — DISCUSSION/SUMMARY
[FreeTextEntry1] : 42 y/o F, , s/p delivery via repeat CS 23 with subsequent readmission 23 due to postpartum PEC. Was discharged on Procardia XL 30mg. BP currently controlled. Urine protein/Cr ratio was checked, 0.1. Patient counseled on increased risk of cHTN and CAD. Advised to eat a heart healthy diet and aim for 150 mins of moderate intensity exercise/week. Check TTE for baseline LV function.  [EKG obtained to assist in diagnosis and management of assessed problem(s)] : EKG obtained to assist in diagnosis and management of assessed problem(s)

## 2024-07-25 NOTE — HISTORY OF PRESENT ILLNESS
[FreeTextEntry1] : 44 y/o F, , s/p delivery via repeat CS 23 with subsequent readmission 23 due to postpartum PEC. Was discharged on Procardia XL 30mg. She reports PEC during her prior pregnancy as well, but states she did not require antihypertensives at that time. She denies any other PMH.  Family Hx: denies premature CAD or sudden cardiac death  Social Hx: former smoker, 1 pack per week x 10 years, quit 5 years ago; occasional ETOH, denies illicit drug use   Patient was last seen by me 24; procardia was restarted due to uncontrolled BP  She currently feels well. Denies HA, blurry vision, CP, dyspnea, palpitations, dizziness, lightheadedness, LE edema.

## 2024-08-01 ENCOUNTER — NON-APPOINTMENT (OUTPATIENT)
Age: 44
End: 2024-08-01

## 2024-08-02 ENCOUNTER — APPOINTMENT (OUTPATIENT)
Dept: NEUROSURGERY | Facility: CLINIC | Age: 44
End: 2024-08-02
Payer: COMMERCIAL

## 2024-08-02 VITALS
OXYGEN SATURATION: 98 % | BODY MASS INDEX: 29.79 KG/M2 | WEIGHT: 166 LBS | DIASTOLIC BLOOD PRESSURE: 83 MMHG | TEMPERATURE: 98.4 F | HEIGHT: 62.5 IN | HEART RATE: 65 BPM | SYSTOLIC BLOOD PRESSURE: 132 MMHG

## 2024-08-02 DIAGNOSIS — M54.2 CERVICALGIA: ICD-10-CM

## 2024-08-02 PROCEDURE — 99212 OFFICE O/P EST SF 10 MIN: CPT

## 2024-08-02 NOTE — HISTORY OF PRESENT ILLNESS
[FreeTextEntry1] : Ms. Marylu Royal is a very pleasant 43 year old female who presents for follow up of neck pain. She was initially seen at the end of March and fortunately her MRI did not show an acute abnormality that warranted surgery. I recommended conservative therapy for her.  Since her last visit in May, she has continued to do better.  She has been doing physical therapy and does report some soreness of her neck after physical therapy that can range from 1-4 out of 10.  She does take some medication for this pain but has not seen pain management recently.  She overall reports improved pain as well as mobility.  She is driving well and able to turn her head well.  No new symptoms.

## 2024-08-02 NOTE — ASSESSMENT
[FreeTextEntry1] : Ms. Marylu Royal is a very pleasant 43 year old female who presents for follow up of neck pain.  She is overall doing very well with conservative management in the form of physical therapy.  I have provided her a new physical therapy prescription.  She and I are both happy with her progress.  I again reviewed her MRI findings with her which demonstrates mild degenerative changes but without need for surgical intervention.  At this time I would like her to continue physical therapy and seeing pain management as needed.  I offered her earlier follow-up but she would like to see me back in 6 months.  All questions answered.  She knows to call my office with any issues or concerns.

## 2024-08-03 LAB
HCT VFR BLD CALC: 43.8 %
HGB BLD-MCNC: 14.1 G/DL
MCHC RBC-ENTMCNC: 27.9 PG
MCHC RBC-ENTMCNC: 32.2 GM/DL
MCV RBC AUTO: 86.6 FL
PLATELET # BLD AUTO: 271 K/UL
RBC # BLD: 5.06 M/UL
RBC # FLD: 13.2 %
TSH SERPL-ACNC: 1.41 UIU/ML
WBC # FLD AUTO: 8.13 K/UL

## 2024-08-04 LAB
ALBUMIN SERPL ELPH-MCNC: 4.7 G/DL
ALP BLD-CCNC: 94 U/L
ALT SERPL-CCNC: 16 U/L
ANION GAP SERPL CALC-SCNC: 12 MMOL/L
AST SERPL-CCNC: 16 U/L
BILIRUB SERPL-MCNC: 0.5 MG/DL
BUN SERPL-MCNC: 14 MG/DL
CALCIUM SERPL-MCNC: 8.9 MG/DL
CHLORIDE SERPL-SCNC: 104 MMOL/L
CHOLEST SERPL-MCNC: 191 MG/DL
CO2 SERPL-SCNC: 24 MMOL/L
CREAT SERPL-MCNC: 0.66 MG/DL
EGFR: 113 ML/MIN/1.73M2
GLUCOSE SERPL-MCNC: 88 MG/DL
HDLC SERPL-MCNC: 53 MG/DL
LDLC SERPL CALC-MCNC: 120 MG/DL
NONHDLC SERPL-MCNC: 139 MG/DL
POTASSIUM SERPL-SCNC: 4.5 MMOL/L
PROT SERPL-MCNC: 7.5 G/DL
SODIUM SERPL-SCNC: 139 MMOL/L
TRIGL SERPL-MCNC: 107 MG/DL

## 2024-08-22 ENCOUNTER — APPOINTMENT (OUTPATIENT)
Dept: CARDIOLOGY | Facility: CLINIC | Age: 44
End: 2024-08-22
Payer: COMMERCIAL

## 2024-08-22 PROCEDURE — 93306 TTE W/DOPPLER COMPLETE: CPT

## 2024-08-28 NOTE — BRIEF OPERATIVE NOTE - ANTIBIOTIC PROTOCOL
Right Knee Intra-articular Injection    Name: Aly Walter   MRN: [id number]  Date: 8/28/2024     Clinical Indications:   Knee Osteoarthritis with symptoms refractory to conservative measures.     After informed consent, the injection site was marked, sterilized with topical chlorhexidine antiseptic, and locally anesthetized with skin refrigerant.    The patient was situation in a comfortable position. Using sterile technique: a single Durolane 60mg/3mL (premixed syringe) was injected utilizing anterolateral approach with a 21 gauge needle.  A band-aid was applied.  The patient tolerated the procedure well.    Disposition:   Return to clinic on an as needed basis.             Apolonia June [facility], PA-C Orthopedic Surgery / Sports Medicine Specialist  [facility]  [address] [place] [email or web address]  [email or web address]  t: [phone]  o: [phone]  f: [phone]          This note was dictated using Dragon software.  While it was briefly proofread prior to completion, some grammatical, spelling, and word choice errors due to dictation may still occur.      
doxycycline/Followed protocol

## 2025-01-22 ENCOUNTER — APPOINTMENT (OUTPATIENT)
Dept: CARDIOLOGY | Facility: CLINIC | Age: 45
End: 2025-01-22
Payer: COMMERCIAL

## 2025-01-22 ENCOUNTER — NON-APPOINTMENT (OUTPATIENT)
Age: 45
End: 2025-01-22

## 2025-01-22 VITALS
BODY MASS INDEX: 27.27 KG/M2 | DIASTOLIC BLOOD PRESSURE: 64 MMHG | SYSTOLIC BLOOD PRESSURE: 116 MMHG | HEART RATE: 70 BPM | HEIGHT: 62.5 IN | OXYGEN SATURATION: 99 % | WEIGHT: 152 LBS

## 2025-01-22 DIAGNOSIS — Z87.891 PERSONAL HISTORY OF NICOTINE DEPENDENCE: ICD-10-CM

## 2025-01-22 DIAGNOSIS — I10 ESSENTIAL (PRIMARY) HYPERTENSION: ICD-10-CM

## 2025-01-22 PROCEDURE — 93000 ELECTROCARDIOGRAM COMPLETE: CPT

## 2025-01-22 PROCEDURE — G2211 COMPLEX E/M VISIT ADD ON: CPT | Mod: NC

## 2025-01-22 PROCEDURE — 99214 OFFICE O/P EST MOD 30 MIN: CPT

## 2025-02-06 ENCOUNTER — NON-APPOINTMENT (OUTPATIENT)
Age: 45
End: 2025-02-06

## 2025-02-06 ENCOUNTER — APPOINTMENT (OUTPATIENT)
Dept: NEUROSURGERY | Facility: CLINIC | Age: 45
End: 2025-02-06
Payer: COMMERCIAL

## 2025-02-06 VITALS
HEART RATE: 63 BPM | SYSTOLIC BLOOD PRESSURE: 126 MMHG | BODY MASS INDEX: 28.71 KG/M2 | DIASTOLIC BLOOD PRESSURE: 80 MMHG | HEIGHT: 62 IN | TEMPERATURE: 98.4 F | OXYGEN SATURATION: 100 % | WEIGHT: 156 LBS

## 2025-02-06 DIAGNOSIS — M54.2 CERVICALGIA: ICD-10-CM

## 2025-02-06 PROCEDURE — 99212 OFFICE O/P EST SF 10 MIN: CPT

## 2025-08-05 ENCOUNTER — APPOINTMENT (OUTPATIENT)
Dept: NEUROSURGERY | Facility: CLINIC | Age: 45
End: 2025-08-05
Payer: COMMERCIAL

## 2025-08-05 VITALS
WEIGHT: 154 LBS | OXYGEN SATURATION: 100 % | HEART RATE: 69 BPM | DIASTOLIC BLOOD PRESSURE: 85 MMHG | HEIGHT: 62 IN | BODY MASS INDEX: 28.34 KG/M2 | SYSTOLIC BLOOD PRESSURE: 128 MMHG | TEMPERATURE: 99.3 F

## 2025-08-05 PROCEDURE — 99213 OFFICE O/P EST LOW 20 MIN: CPT

## 2025-08-13 ENCOUNTER — APPOINTMENT (OUTPATIENT)
Dept: OTOLARYNGOLOGY | Facility: CLINIC | Age: 45
End: 2025-08-13
Payer: COMMERCIAL

## 2025-08-13 VITALS
WEIGHT: 154 LBS | BODY MASS INDEX: 28.34 KG/M2 | HEART RATE: 80 BPM | SYSTOLIC BLOOD PRESSURE: 140 MMHG | DIASTOLIC BLOOD PRESSURE: 80 MMHG | HEIGHT: 62 IN

## 2025-08-13 DIAGNOSIS — M26.609 UNSPECIFIED TEMPOROMANDIBULAR JOINT DISORDER: ICD-10-CM

## 2025-08-13 DIAGNOSIS — H93.13 TINNITUS, BILATERAL: ICD-10-CM

## 2025-08-13 PROCEDURE — 99203 OFFICE O/P NEW LOW 30 MIN: CPT

## 2025-09-10 ENCOUNTER — APPOINTMENT (OUTPATIENT)
Dept: MRI IMAGING | Facility: CLINIC | Age: 45
End: 2025-09-10

## 2025-09-19 ENCOUNTER — NON-APPOINTMENT (OUTPATIENT)
Age: 45
End: 2025-09-19